# Patient Record
Sex: FEMALE | Race: WHITE | Employment: FULL TIME | URBAN - METROPOLITAN AREA
[De-identification: names, ages, dates, MRNs, and addresses within clinical notes are randomized per-mention and may not be internally consistent; named-entity substitution may affect disease eponyms.]

---

## 2018-01-21 ENCOUNTER — HOSPITAL ENCOUNTER (EMERGENCY)
Age: 38
Discharge: HOME | End: 2018-01-22
Attending: INTERNAL MEDICINE
Payer: COMMERCIAL

## 2018-01-21 VITALS
DIASTOLIC BLOOD PRESSURE: 74 MMHG | SYSTOLIC BLOOD PRESSURE: 142 MMHG | OXYGEN SATURATION: 98 % | HEART RATE: 103 BPM | RESPIRATION RATE: 20 BRPM | BODY MASS INDEX: 25.11 KG/M2 | TEMPERATURE: 98 F | HEIGHT: 67 IN | WEIGHT: 160 LBS

## 2018-01-21 DIAGNOSIS — J40 BRONCHITIS: Primary | ICD-10-CM

## 2018-01-21 LAB
RAPID INFLUENZA  B AGN: NEGATIVE
RAPID INFLUENZA A AGN: NEGATIVE
S PYO AG THROAT QL: NEGATIVE

## 2018-01-21 PROCEDURE — 99284 EMERGENCY DEPT VISIT MOD MDM: CPT

## 2018-01-21 PROCEDURE — 87880 STREP A ASSAY W/OPTIC: CPT

## 2018-01-21 PROCEDURE — 86403 PARTICLE AGGLUT ANTBDY SCRN: CPT

## 2018-01-21 PROCEDURE — 87081 CULTURE SCREEN ONLY: CPT

## 2018-01-21 RX ORDER — IPRATROPIUM BROMIDE AND ALBUTEROL SULFATE 2.5; .5 MG/3ML; MG/3ML
1 SOLUTION RESPIRATORY (INHALATION)
Status: DISCONTINUED | OUTPATIENT
Start: 2018-01-22 | End: 2018-01-22 | Stop reason: HOSPADM

## 2018-01-21 RX ORDER — ALBUTEROL SULFATE 90 UG/1
2 AEROSOL, METERED RESPIRATORY (INHALATION) EVERY 6 HOURS PRN
Qty: 1 INHALER | Refills: 3 | Status: SHIPPED | OUTPATIENT
Start: 2018-01-21 | End: 2020-03-17 | Stop reason: SDUPTHER

## 2018-01-21 ASSESSMENT — PAIN DESCRIPTION - PAIN TYPE: TYPE: ACUTE PAIN

## 2018-01-21 ASSESSMENT — ENCOUNTER SYMPTOMS
SHORTNESS OF BREATH: 1
SORE THROAT: 1
NAUSEA: 1
COUGH: 1
WHEEZING: 1

## 2018-01-21 ASSESSMENT — PAIN DESCRIPTION - LOCATION: LOCATION: CHEST;HEAD

## 2018-01-21 ASSESSMENT — PAIN DESCRIPTION - PROGRESSION: CLINICAL_PROGRESSION: GRADUALLY WORSENING

## 2018-01-21 ASSESSMENT — PAIN DESCRIPTION - ONSET: ONSET: ON-GOING

## 2018-01-21 ASSESSMENT — PAIN DESCRIPTION - DESCRIPTORS: DESCRIPTORS: ACHING

## 2018-01-21 ASSESSMENT — PAIN DESCRIPTION - FREQUENCY: FREQUENCY: CONTINUOUS

## 2018-01-22 ENCOUNTER — APPOINTMENT (OUTPATIENT)
Dept: GENERAL RADIOLOGY | Age: 38
End: 2018-01-22
Payer: COMMERCIAL

## 2018-01-22 LAB — HCG(URINE) PREGNANCY TEST: NEGATIVE

## 2018-01-22 PROCEDURE — 6370000000 HC RX 637 (ALT 250 FOR IP): Performed by: INTERNAL MEDICINE

## 2018-01-22 PROCEDURE — 94640 AIRWAY INHALATION TREATMENT: CPT

## 2018-01-22 PROCEDURE — 84703 CHORIONIC GONADOTROPIN ASSAY: CPT

## 2018-01-22 PROCEDURE — 71045 X-RAY EXAM CHEST 1 VIEW: CPT

## 2018-01-22 RX ORDER — PREDNISONE 20 MG/1
40 TABLET ORAL DAILY
Qty: 6 TABLET | Refills: 0 | Status: SHIPPED | OUTPATIENT
Start: 2018-01-22 | End: 2018-01-25

## 2018-01-22 RX ORDER — AZITHROMYCIN 250 MG/1
TABLET, FILM COATED ORAL
Qty: 4 TABLET | Refills: 0 | Status: SHIPPED | OUTPATIENT
Start: 2018-01-22 | End: 2018-02-01

## 2018-01-22 RX ORDER — PREDNISONE 20 MG/1
40 TABLET ORAL ONCE
Status: COMPLETED | OUTPATIENT
Start: 2018-01-22 | End: 2018-01-22

## 2018-01-22 RX ORDER — AZITHROMYCIN 250 MG/1
500 TABLET, FILM COATED ORAL ONCE
Status: COMPLETED | OUTPATIENT
Start: 2018-01-22 | End: 2018-01-22

## 2018-01-22 RX ORDER — IPRATROPIUM BROMIDE AND ALBUTEROL SULFATE 2.5; .5 MG/3ML; MG/3ML
SOLUTION RESPIRATORY (INHALATION)
Status: DISCONTINUED
Start: 2018-01-22 | End: 2018-01-22 | Stop reason: HOSPADM

## 2018-01-22 RX ADMIN — PREDNISONE 40 MG: 20 TABLET ORAL at 00:50

## 2018-01-22 RX ADMIN — IPRATROPIUM BROMIDE AND ALBUTEROL SULFATE 1 AMPULE: .5; 3 SOLUTION RESPIRATORY (INHALATION) at 00:02

## 2018-01-22 RX ADMIN — AZITHROMYCIN 500 MG: 250 TABLET, FILM COATED ORAL at 00:50

## 2018-01-22 NOTE — ED PROVIDER NOTES
[facility] ED  eMERGENCY dEPARTMENT eNCOUnter      Pt Name: Kenan Hammond  MRN: [id number]  [place] 1980  Date of evaluation: 1/21/2018  Provider: Cristiana Claros MD    CHIEF COMPLAINT       Chief Complaint   Patient presents with    Cough     with sob, history of asthma         HISTORY OF PRESENT ILLNESS   (Location/Symptom, Timing/Onset, Context/Setting, Quality, Duration, Modifying Factors, Severity)  Note limiting factors. Kenan Hammond is a 44 y.o. female who presents to the emergency department For Productive cough with yellowish sputum that developed today. She stated that she started having some wheezing and shortness of breath for which her albuterol is not helpful. She reports that her inhaler is old. She started losing her voice about 3 days ago. Some sore throat. She has not taken her temperature. She's been nauseous as well. By mouth intake is satisfactory. HPI    Nursing Notes were reviewed. REVIEW OF SYSTEMS    (2-9 systems for level 4, 10 or more for level 5)     Review of Systems   Constitutional: Negative. HENT: Positive for sore throat. Respiratory: Positive for cough, shortness of breath and wheezing. Gastrointestinal: Positive for nausea. All other systems reviewed and are negative. Except as noted above the remainder of the review of systems was reviewed and negative.        PAST MEDICAL HISTORY     Past Medical History:   Diagnosis Date    Asthma     Chronic ear infection     Depressed     Double, Major and Dystemic    Fainting     since age 14    Scoliosis     Vertigo     Vision loss     poor perpherial vision, doesn't drive         SURGICAL HISTORY       Past Surgical History:   Procedure Laterality Date    TUBAL LIGATION           CURRENT MEDICATIONS       Previous Medications    ACETAMINOPHEN-CODEINE (TYLENOL #3) 300-30 MG PER TABLET        ELASTIC BANDAGES & SUPPORTS (NEXCARE CARPAL TUNNEL BRACE) MISC    Dx: bilateral carpal normal heart sounds and intact distal pulses. Exam reveals no gallop and no friction rub. No murmur heard. Pulmonary/Chest: Effort normal. No respiratory distress. She has no wheezes. She exhibits no tenderness. Rhonchi through out the lung fields with increased expiratory phase. The cough is harsh and productive   Abdominal: Soft. Bowel sounds are normal. She exhibits no mass. There is no tenderness. There is no rebound and no guarding. Musculoskeletal: Normal range of motion. She exhibits no tenderness. Lymphadenopathy:     She has no cervical adenopathy. Neurological: She is alert and oriented to person, place, and time. She has normal reflexes. Skin: Skin is warm and dry. Psychiatric: She has a normal mood and affect. Her behavior is normal.   Nursing note and vitals reviewed. DIAGNOSTIC RESULTS     EKG: All EKG's are interpreted by the Emergency Department Physician who either signs or Co-signs this chart in the absence of a cardiologist.    Not indicated    RADIOLOGY:   Non-plain film images such as CT, Ultrasound and MRI are read by the radiologist. Plain radiographic images are visualized and preliminarily interpreted by the emergency physician with the below findings:    No acute disease process    Interpretation per the Radiologist below, if available at the time of this note:    XR CHEST PORTABLE    (Results Pending)         ED BEDSIDE ULTRASOUND:   Performed by ED Physician - none    LABS:  Labs Reviewed   RAPID INFLUENZA A/B ANTIGENS   RAPID STREP SCREEN   PREGNANCY, URINE   CULTURE BETA STREP CONFIRM PLATE       All other labs were within normal range or not returned as of this dictation.     EMERGENCY DEPARTMENT COURSE and DIFFERENTIAL DIAGNOSIS/MDM:   Vitals:    Vitals:    01/21/18 2326   BP: (!) 142/74   Pulse: 103   Resp: 20   Temp: 98 °F (36.7 °C)   TempSrc: Oral   SpO2: 98%   Weight: 160 lb (72.6 kg)   Height: 5' 7\" (1.702 m)       Noted    MDM  ED Course as of Jan 22 0050

## 2018-01-24 LAB — S PYO THROAT QL CULT: NORMAL

## 2018-04-09 ENCOUNTER — OFFICE VISIT (OUTPATIENT)
Dept: INTERNAL MEDICINE | Age: 38
End: 2018-04-09
Payer: COMMERCIAL

## 2018-04-09 VITALS
HEART RATE: 108 BPM | WEIGHT: 175 LBS | DIASTOLIC BLOOD PRESSURE: 80 MMHG | OXYGEN SATURATION: 98 % | BODY MASS INDEX: 27.41 KG/M2 | SYSTOLIC BLOOD PRESSURE: 112 MMHG

## 2018-04-09 DIAGNOSIS — R07.89 CHEST PAIN, MUSCULAR: Primary | ICD-10-CM

## 2018-04-09 PROCEDURE — G8419 CALC BMI OUT NRM PARAM NOF/U: HCPCS | Performed by: PHYSICIAN ASSISTANT

## 2018-04-09 PROCEDURE — G8427 DOCREV CUR MEDS BY ELIG CLIN: HCPCS | Performed by: PHYSICIAN ASSISTANT

## 2018-04-09 PROCEDURE — 99213 OFFICE O/P EST LOW 20 MIN: CPT | Performed by: PHYSICIAN ASSISTANT

## 2018-04-09 PROCEDURE — 1036F TOBACCO NON-USER: CPT | Performed by: PHYSICIAN ASSISTANT

## 2018-04-09 ASSESSMENT — PATIENT HEALTH QUESTIONNAIRE - PHQ9
SUM OF ALL RESPONSES TO PHQ QUESTIONS 1-9: 0
SUM OF ALL RESPONSES TO PHQ9 QUESTIONS 1 & 2: 0
1. LITTLE INTEREST OR PLEASURE IN DOING THINGS: 0
2. FEELING DOWN, DEPRESSED OR HOPELESS: 0

## 2019-09-26 ENCOUNTER — OFFICE VISIT (OUTPATIENT)
Dept: FAMILY MEDICINE CLINIC | Age: 39
End: 2019-09-26
Payer: COMMERCIAL

## 2019-09-26 VITALS
WEIGHT: 132.8 LBS | SYSTOLIC BLOOD PRESSURE: 106 MMHG | TEMPERATURE: 98.1 F | DIASTOLIC BLOOD PRESSURE: 70 MMHG | HEART RATE: 81 BPM | OXYGEN SATURATION: 98 % | BODY MASS INDEX: 20.84 KG/M2 | HEIGHT: 67 IN | RESPIRATION RATE: 18 BRPM

## 2019-09-26 DIAGNOSIS — R45.851 SUICIDAL IDEATION: Primary | ICD-10-CM

## 2019-09-26 PROCEDURE — G0444 DEPRESSION SCREEN ANNUAL: HCPCS | Performed by: FAMILY MEDICINE

## 2019-09-26 PROCEDURE — 99214 OFFICE O/P EST MOD 30 MIN: CPT | Performed by: FAMILY MEDICINE

## 2019-09-26 ASSESSMENT — ENCOUNTER SYMPTOMS
BACK PAIN: 0
NAUSEA: 0
VOMITING: 0
SHORTNESS OF BREATH: 0
EYE PAIN: 0
ABDOMINAL PAIN: 0
COUGH: 0
TROUBLE SWALLOWING: 0
CONSTIPATION: 0
CHEST TIGHTNESS: 0
DIARRHEA: 0

## 2019-09-26 ASSESSMENT — COLUMBIA-SUICIDE SEVERITY RATING SCALE - C-SSRS
5. HAVE YOU STARTED TO WORK OUT OR WORKED OUT THE DETAILS OF HOW TO KILL YOURSELF? DO YOU INTEND TO CARRY OUT THIS PLAN?: YES
6. HAVE YOU EVER DONE ANYTHING, STARTED TO DO ANYTHING, OR PREPARED TO DO ANYTHING TO END YOUR LIFE?: YES
1. WITHIN THE PAST MONTH, HAVE YOU WISHED YOU WERE DEAD OR WISHED YOU COULD GO TO SLEEP AND NOT WAKE UP?: YES
2. HAVE YOU ACTUALLY HAD ANY THOUGHTS OF KILLING YOURSELF?: YES
3. HAVE YOU BEEN THINKING ABOUT HOW YOU MIGHT KILL YOURSELF?: YES
7. DID THIS OCCUR IN THE LAST THREE MONTHS: WITHIN THE LAST THREE MONTHS?
4. HAVE YOU HAD THESE THOUGHTS AND HAD SOME INTENTION OF ACTING ON THEM?: YES

## 2019-09-26 ASSESSMENT — PATIENT HEALTH QUESTIONNAIRE - PHQ9
2. FEELING DOWN, DEPRESSED OR HOPELESS: 3
9. THOUGHTS THAT YOU WOULD BE BETTER OFF DEAD, OR OF HURTING YOURSELF: 3
7. TROUBLE CONCENTRATING ON THINGS, SUCH AS READING THE NEWSPAPER OR WATCHING TELEVISION: 3
4. FEELING TIRED OR HAVING LITTLE ENERGY: 3
10. IF YOU CHECKED OFF ANY PROBLEMS, HOW DIFFICULT HAVE THESE PROBLEMS MADE IT FOR YOU TO DO YOUR WORK, TAKE CARE OF THINGS AT HOME, OR GET ALONG WITH OTHER PEOPLE: 1
8. MOVING OR SPEAKING SO SLOWLY THAT OTHER PEOPLE COULD HAVE NOTICED. OR THE OPPOSITE, BEING SO FIGETY OR RESTLESS THAT YOU HAVE BEEN MOVING AROUND A LOT MORE THAN USUAL: 0
6. FEELING BAD ABOUT YOURSELF - OR THAT YOU ARE A FAILURE OR HAVE LET YOURSELF OR YOUR FAMILY DOWN: 3
SUM OF ALL RESPONSES TO PHQ QUESTIONS 1-9: 24
3. TROUBLE FALLING OR STAYING ASLEEP: 3
SUM OF ALL RESPONSES TO PHQ QUESTIONS 1-9: 24
5. POOR APPETITE OR OVEREATING: 3
1. LITTLE INTEREST OR PLEASURE IN DOING THINGS: 3
SUM OF ALL RESPONSES TO PHQ9 QUESTIONS 1 & 2: 6

## 2019-09-26 NOTE — PROGRESS NOTES
Subjective:      Patient ID: Vianney Weber is a 35 y.o. female who presents today for:  Chief Complaint   Patient presents with   [address] from Hospital     follow up from hospital visit on 9-17-19 for overdose on Tylenol and ibrophen       HPI     Patient was hospitalized at [facility] on 9/15/2019 and was discharged on 9/19/2019 for an intentional overdose. She states that she was attempting suicide. She took an entire bottle of acetaminophen and half a bottle of ibuprofen. The patient is positive for both depression screening and suicide screening today at her visit. I called the [facility] and spoke with an intake representative, Herlinda Crook; he explained to me that 911 should be called immediately and the patient should be transferred to the emergency department. 911 was called for both ambulance and police back-up, if needed. The patient is hesitant about going to the hospital, as she is concerned about her job.     Past Medical History:   Diagnosis Date    Allergic rhinitis     Asthma     Chronic ear infection     Depressed     Double, Major and Dystemic    Fainting     since age 14    Headache     Scoliosis     Vertigo     Vision loss     poor perpherial vision, doesn't drive     Past Surgical History:   Procedure Laterality Date    TUBAL LIGATION       Social History     Socioeconomic History    Marital status:      Spouse name: Not on file    Number of children: Not on file    Years of education: Not on file    Highest education level: Not on file   Occupational History    Not on file   Social Needs    Financial resource strain: Not on file    Food insecurity:     Worry: Not on file     Inability: Not on file    Transportation needs:     Medical: Not on file     Non-medical: Not on file   Tobacco Use    Smoking status: Never Smoker    Smokeless tobacco: Never Used   Substance and Sexual Activity    Alcohol use: Yes     Comment: rarely    Wt 132 lb 12.8 oz (60.2 kg)   LMP 09/22/2019 (Exact Date)   SpO2 98%   BMI 20.80 kg/m²     Physical Exam   Constitutional: She is oriented to person, place, and time. She appears well-developed and well-nourished. Patient appears to be a bit disheveled. HENT:   Head: Normocephalic and atraumatic. Eyes: Pupils are equal, round, and reactive to light. EOM are normal.   Neck: Normal range of motion. Neck supple. Cardiovascular: Normal rate, regular rhythm and normal heart sounds. Pulmonary/Chest: Effort normal and breath sounds normal.   Abdominal: Soft. Bowel sounds are normal.   Neurological: She is alert and oriented to person, place, and time. Skin: Skin is warm and dry. Psychiatric:   Depressed affect. Patient verbalizes that she has ideas of suicide all day long. She feels that she is \"in control of these thoughts\". Nursing note and vitals reviewed. No results found for this visit on 09/26/19. Assessment:       Diagnosis Orders   1. Suicidal ideation           Plan:      No orders of the defined types were placed in this encounter. No orders of the defined types were placed in this encounter. 911 was called; both ambulance and police showed up to the office. I personally escorted the patient to the EMS team, which walked her to the ambulance. Police were not needed, as the patient was not combative. Patient was instructed to return for follow-up with me after she is discharged from the hospital.    Return in about 1 week (around 10/3/2019) for Hospital follow up. UpToDate was referenced for current practice guidelines and the current standard of care pertaining specifically to this patient.     Lily Betancourt DO

## 2019-10-03 ENCOUNTER — HOSPITAL ENCOUNTER (EMERGENCY)
Age: 39
Discharge: HOME | End: 2019-10-04
Attending: EMERGENCY MEDICINE
Payer: COMMERCIAL

## 2019-10-03 DIAGNOSIS — L24.5 IRRITANT CONTACT DERMATITIS DUE TO CHEMICAL: Primary | ICD-10-CM

## 2019-10-03 PROCEDURE — 99283 EMERGENCY DEPT VISIT LOW MDM: CPT

## 2019-10-04 VITALS
HEART RATE: 63 BPM | TEMPERATURE: 98.3 F | SYSTOLIC BLOOD PRESSURE: 119 MMHG | BODY MASS INDEX: 21.21 KG/M2 | WEIGHT: 132 LBS | RESPIRATION RATE: 16 BRPM | DIASTOLIC BLOOD PRESSURE: 72 MMHG | OXYGEN SATURATION: 98 % | HEIGHT: 66 IN

## 2019-10-04 RX ORDER — TRAZODONE HYDROCHLORIDE 50 MG/1
50 TABLET ORAL NIGHTLY PRN
COMMUNITY
End: 2021-09-29

## 2019-10-04 RX ORDER — CITALOPRAM 20 MG/1
20 TABLET ORAL DAILY
COMMUNITY
End: 2021-09-29

## 2019-10-04 ASSESSMENT — ENCOUNTER SYMPTOMS
SHORTNESS OF BREATH: 0
SORE THROAT: 0
VOMITING: 0
COUGH: 0
NAUSEA: 0
CONSTIPATION: 0
SINUS PRESSURE: 0
ABDOMINAL PAIN: 0
WHEEZING: 0
EYE PAIN: 0
PHOTOPHOBIA: 0
ABDOMINAL DISTENTION: 0
DIARRHEA: 0
RHINORRHEA: 0
COLOR CHANGE: 0
BACK PAIN: 0
APNEA: 0

## 2019-10-04 ASSESSMENT — PAIN DESCRIPTION - LOCATION: LOCATION: HAND

## 2019-10-04 ASSESSMENT — PAIN DESCRIPTION - ORIENTATION: ORIENTATION: LEFT

## 2019-10-04 ASSESSMENT — PAIN DESCRIPTION - PAIN TYPE: TYPE: ACUTE PAIN

## 2019-10-04 ASSESSMENT — PAIN SCALES - GENERAL: PAINLEVEL_OUTOF10: 2

## 2019-10-04 ASSESSMENT — PAIN DESCRIPTION - DESCRIPTORS: DESCRIPTORS: TINGLING

## 2019-11-21 ENCOUNTER — OFFICE VISIT (OUTPATIENT)
Dept: FAMILY MEDICINE CLINIC | Age: 39
End: 2019-11-21
Payer: COMMERCIAL

## 2019-11-21 VITALS
OXYGEN SATURATION: 98 % | DIASTOLIC BLOOD PRESSURE: 80 MMHG | HEART RATE: 84 BPM | BODY MASS INDEX: 21.6 KG/M2 | RESPIRATION RATE: 20 BRPM | HEIGHT: 66 IN | TEMPERATURE: 98.5 F | SYSTOLIC BLOOD PRESSURE: 132 MMHG | WEIGHT: 134.4 LBS

## 2019-11-21 DIAGNOSIS — R07.9 CHEST PAIN, UNSPECIFIED TYPE: Primary | ICD-10-CM

## 2019-11-21 PROCEDURE — 1036F TOBACCO NON-USER: CPT | Performed by: FAMILY MEDICINE

## 2019-11-21 PROCEDURE — 93000 ELECTROCARDIOGRAM COMPLETE: CPT | Performed by: FAMILY MEDICINE

## 2019-11-21 PROCEDURE — G8427 DOCREV CUR MEDS BY ELIG CLIN: HCPCS | Performed by: FAMILY MEDICINE

## 2019-11-21 PROCEDURE — G8482 FLU IMMUNIZE ORDER/ADMIN: HCPCS | Performed by: FAMILY MEDICINE

## 2019-11-21 PROCEDURE — 99214 OFFICE O/P EST MOD 30 MIN: CPT | Performed by: FAMILY MEDICINE

## 2019-11-21 PROCEDURE — G8420 CALC BMI NORM PARAMETERS: HCPCS | Performed by: FAMILY MEDICINE

## 2019-11-24 ASSESSMENT — ENCOUNTER SYMPTOMS
VOMITING: 0
BACK PAIN: 0
TROUBLE SWALLOWING: 0
COUGH: 0
EYE PAIN: 0
CONSTIPATION: 0
SHORTNESS OF BREATH: 0
ABDOMINAL PAIN: 0
NAUSEA: 0
CHEST TIGHTNESS: 0
DIARRHEA: 0

## 2019-12-11 ENCOUNTER — INITIAL CONSULT (OUTPATIENT)
Dept: CARDIOLOGY CLINIC | Age: 39
End: 2019-12-11
Payer: MEDICAID

## 2019-12-11 ENCOUNTER — TELEPHONE (OUTPATIENT)
Dept: FAMILY MEDICINE CLINIC | Age: 39
End: 2019-12-11

## 2019-12-11 VITALS
HEIGHT: 66 IN | RESPIRATION RATE: 12 BRPM | BODY MASS INDEX: 21.53 KG/M2 | HEART RATE: 82 BPM | SYSTOLIC BLOOD PRESSURE: 126 MMHG | DIASTOLIC BLOOD PRESSURE: 80 MMHG | WEIGHT: 134 LBS

## 2019-12-11 DIAGNOSIS — R07.89 OTHER CHEST PAIN: Primary | ICD-10-CM

## 2019-12-11 PROCEDURE — 99244 OFF/OP CNSLTJ NEW/EST MOD 40: CPT | Performed by: INTERNAL MEDICINE

## 2019-12-11 ASSESSMENT — ENCOUNTER SYMPTOMS
ANAL BLEEDING: 0
COLOR CHANGE: 0
VOMITING: 0
DIARRHEA: 0
SHORTNESS OF BREATH: 0
BLOOD IN STOOL: 0
ABDOMINAL DISTENTION: 0
COUGH: 0
FACIAL SWELLING: 0
TROUBLE SWALLOWING: 0
ABDOMINAL PAIN: 0
VOICE CHANGE: 0
CHEST TIGHTNESS: 0
WHEEZING: 0
APNEA: 0
NAUSEA: 0

## 2019-12-20 ENCOUNTER — HOSPITAL ENCOUNTER (OUTPATIENT)
Dept: NON INVASIVE DIAGNOSTICS | Age: 39
Discharge: HOME | End: 2019-12-20
Payer: MEDICAID

## 2019-12-20 DIAGNOSIS — R07.9 CHEST PAIN, UNSPECIFIED TYPE: ICD-10-CM

## 2019-12-20 LAB
LV EF: 65 %
LVEF MODALITY: NORMAL

## 2019-12-20 PROCEDURE — 93306 TTE W/DOPPLER COMPLETE: CPT

## 2019-12-20 PROCEDURE — 93017 CV STRESS TEST TRACING ONLY: CPT

## 2019-12-30 ENCOUNTER — OFFICE VISIT (OUTPATIENT)
Dept: FAMILY MEDICINE CLINIC | Age: 39
End: 2019-12-30
Payer: MEDICAID

## 2019-12-30 VITALS
SYSTOLIC BLOOD PRESSURE: 122 MMHG | WEIGHT: 142.2 LBS | TEMPERATURE: 98.2 F | OXYGEN SATURATION: 98 % | HEART RATE: 74 BPM | RESPIRATION RATE: 18 BRPM | BODY MASS INDEX: 22.85 KG/M2 | DIASTOLIC BLOOD PRESSURE: 74 MMHG | HEIGHT: 66 IN

## 2019-12-30 DIAGNOSIS — R07.9 CHEST PAIN, UNSPECIFIED TYPE: Primary | ICD-10-CM

## 2019-12-30 PROCEDURE — 99213 OFFICE O/P EST LOW 20 MIN: CPT | Performed by: FAMILY MEDICINE

## 2019-12-30 ASSESSMENT — ENCOUNTER SYMPTOMS
SHORTNESS OF BREATH: 0
DIARRHEA: 0
WHEEZING: 0
COUGH: 0
TROUBLE SWALLOWING: 0
CHEST TIGHTNESS: 1
CONSTIPATION: 0
STRIDOR: 0
NAUSEA: 0
VOMITING: 0
CHOKING: 0
ABDOMINAL PAIN: 0

## 2020-01-15 ENCOUNTER — OFFICE VISIT (OUTPATIENT)
Dept: CARDIOLOGY CLINIC | Age: 40
End: 2020-01-15

## 2020-01-15 VITALS
HEIGHT: 66 IN | HEART RATE: 83 BPM | OXYGEN SATURATION: 98 % | BODY MASS INDEX: 22.5 KG/M2 | RESPIRATION RATE: 20 BRPM | DIASTOLIC BLOOD PRESSURE: 82 MMHG | WEIGHT: 140 LBS | SYSTOLIC BLOOD PRESSURE: 128 MMHG

## 2020-01-15 PROCEDURE — 99213 OFFICE O/P EST LOW 20 MIN: CPT | Performed by: INTERNAL MEDICINE

## 2020-01-15 ASSESSMENT — ENCOUNTER SYMPTOMS
NAUSEA: 0
BLOOD IN STOOL: 0
VOMITING: 0
SHORTNESS OF BREATH: 0
APNEA: 0
DIARRHEA: 0

## 2020-01-15 NOTE — PROGRESS NOTES
[facility] CARDIOLOGY OFFICE FOLLOW-UP      Patient: Andres Fountain  YOB: 1980  MRN: [id number]    Chief Complaint:  Chief Complaint   Patient presents with    1 Month Follow-Up    Chest Pain         Subjective/HPI:  1/15/2020: Patient presents today for follow-up of chest pain. Echo was unremarkable. Stress test was negative for ischemia. Exercise tolerance was diminished. Most likely musculoskeletal pain. He was seen in [facility]. Try to reassure her. She will see me in 6 months     12/11/19: Patient presents today for evaluation of chest pain. She used to work in a factory. Currently unemployed. 35years old white female complains of left sided chest pain more like pressure. No nausea no vomiting. Worse with exertion. No fever or chills. No cough. Does not smoke. She has strong family history of coronary artery disease. She is . Menstrual periods are regular.   Agree with Dr. Vish Roberts for stress test and echo and then see me.         Past Medical History:   Diagnosis Date    Allergic rhinitis     Asthma     Chronic ear infection     Depressed     Double, Major and Dystemic    Fainting     since age 14    Headache     Scoliosis     Vertigo     Vision loss     poor perpherial vision, doesn't drive       Past Surgical History:   Procedure Laterality Date    TUBAL LIGATION         Family History   Problem Relation Age of Onset    Diabetes Mother 15   Sherren Kehr Arthritis Mother     Kidney Disease Mother         stage 3    Heart Disease Mother         CAD    Other Mother         Neurophathy, eye problems    Clotting Disorder Mother         Brain    Asthma Father     Diabetes Sister         due to mom DM    Alcohol Abuse Neg Hx     Allergy (Severe) Neg Hx     Anemia Neg Hx     Arrhythmia Neg Hx     Atrial Fibrillation Neg Hx     Birth Defects Neg Hx     Breast Cancer Neg Hx     Cancer Neg Hx     Coronary Art Dis Neg Hx     Colon Cancer Neg Hx  Depression Neg Hx     Early Death Neg Hx     Heart Attack Neg Hx     Hearing Loss Neg Hx     High Blood Pressure Neg Hx     High Cholesterol Neg Hx     Learning Disabilities Neg Hx     Mental Illness Neg Hx     Mental Retardation Neg Hx     Miscarriages / Stillbirths Neg Hx     Obesity Neg Hx     Osteoporosis Neg Hx     Prostate Cancer Neg Hx     Stroke Neg Hx     Substance Abuse Neg Hx     Vision Loss Neg Hx        Social History     Socioeconomic History    Marital status:      Spouse name: None    Number of children: None    Years of education: None    Highest education level: None   Occupational History    None   Social Needs    Financial resource strain: None    Food insecurity:     Worry: None     Inability: None    Transportation needs:     Medical: None     Non-medical: None   Tobacco Use    Smoking status: Never Smoker    Smokeless tobacco: Never Used   Substance and Sexual Activity    Alcohol use: Yes     Comment: rarely    Drug use: No    Sexual activity: None   Lifestyle    Physical activity:     Days per week: None     Minutes per session: None    Stress: None   Relationships    Social connections:     Talks on phone: None     Gets together: None     Attends Ep[employer]alian service: None     Active member of club or organization: None     Attends meetings of clubs or organizations: None     Relationship status: None    Intimate partner violence:     Fear of current or ex partner: None     Emotionally abused: None     Physically abused: None     Forced sexual activity: None   Other Topics Concern    None   Social History Narrative    None       Allergies   Allergen Reactions    Latex Hives    Bismuth Subsalicylate Hives    Antibacterial Hand Soap [Triclosan] Hives    Kiwi Extract Hives    Strawberry Extract Hives    Viibryd [Vilazodone Hcl] Hives    Effexor [Venlafaxine] Rash       Current Outpatient Medications   Medication Sig Dispense Refill    citalopram (CELEXA) 20 MG tablet Take 20 mg by mouth daily      traZODone (DESYREL) 50 MG tablet Take 50 mg by mouth nightly as needed for Sleep      Multiple Vitamins-Minerals (MULTIVITAMIN ADULTS PO) Take by mouth daily      vitamin D (CHOLECALCIFEROL) 1000 UNIT TABS tablet Take 1,000 Units by mouth daily      albuterol sulfate HFA (VENTOLIN HFA) 108 (90 Base) MCG/ACT inhaler Inhale 2 puffs into the lungs every 6 hours as needed for Wheezing 1 Inhaler 3    TRINTELLIX 20 MG TABS tablet       acetaminophen-codeine (TYLENOL #3) 300-30 MG per tablet   0    Elastic Bandages & Supports (NEXCARE CARPAL TUNNEL BRACE) MISC Dx: bilateral carpal tunnel 2 each 0     No current facility-administered medications for this visit. Review of Systems:   Review of Systems   Constitutional: Negative for activity change, appetite change, diaphoresis, fatigue and unexpected weight change. HENT: Negative for facial swelling, nosebleeds, trouble swallowing and voice change. Respiratory: Positive for chest tightness. Negative for apnea, shortness of breath and wheezing. Cardiovascular: Negative for chest pain, palpitations and leg swelling. Gastrointestinal: Negative for abdominal distention, anal bleeding, blood in stool, diarrhea, nausea and vomiting. Genitourinary: Negative for decreased urine volume and dysuria. Musculoskeletal: Negative for gait problem, myalgias, neck pain and neck stiffness. Skin: Negative for color change, pallor, rash and wound. Neurological: Negative for dizziness, seizures, syncope, facial asymmetry, weakness, light-headedness, numbness and headaches. Hematological: Does not bruise/bleed easily. Psychiatric/Behavioral: Negative for agitation, behavioral problems, confusion, hallucinations and suicidal ideas. The patient is not nervous/anxious. All other systems reviewed and are negative. Review of System is negative except for as mentioned above.       Physical Examination:    /82 (Site: Left Upper Arm, Position: Sitting, Cuff Size: Medium Adult)   Pulse 83   Resp 20   Ht 5' 6\" (1.676 m)   Wt 140 lb (63.5 kg)   LMP 01/06/2020 (Approximate)   SpO2 98%   BMI 22.60 kg/m²    Physical Exam   Constitutional: She appears healthy. No distress. HENT:   Nose: Nose normal.   Mouth/Throat: Dentition is normal. Oropharynx is clear. Eyes: Pupils are equal, round, and reactive to light. Conjunctivae are normal.   Neck: Normal range of motion and thyroid normal. Neck supple. Cardiovascular: Regular rhythm, S1 normal, S2 normal, normal heart sounds, intact distal pulses and normal pulses. PMI is not displaced. No murmur heard. Pulmonary/Chest: She has no wheezes. She has no rales. She exhibits no tenderness. Abdominal: Soft. Bowel sounds are normal. She exhibits no distension and no mass. There is no splenomegaly or hepatomegaly. There is no tenderness. No hernia. Neurological: She is alert and oriented to person, place, and time. She has normal motor skills. Gait normal.   Skin: Skin is warm and dry. No cyanosis. No jaundice. Nails show no clubbing. Patient Active Problem List   Diagnosis    Asthma    Allergic rhinitis    Chronic ear infection    Depressed    Fainting    Headache    Scoliosis    Vertigo    Vision loss           No orders of the defined types were placed in this encounter. No orders of the defined types were placed in this encounter. Assessment:    1. Other chest pain       Plan:     Stay on same medications. See me in 6 months. This note was partially generated using Dragon voice recognition system, and there may be some incorrect words, spellings, punctuation that were not noticed in checking the note before saving.         Electronically signed by Isak Becker MD on 1/21/2020 at 9:24 AM

## 2020-01-19 ASSESSMENT — ENCOUNTER SYMPTOMS
ABDOMINAL DISTENTION: 0
COLOR CHANGE: 0
TROUBLE SWALLOWING: 0
ANAL BLEEDING: 0
VOICE CHANGE: 0
FACIAL SWELLING: 0
CHEST TIGHTNESS: 1
WHEEZING: 0

## 2020-03-17 ENCOUNTER — OFFICE VISIT (OUTPATIENT)
Dept: PRIMARY CARE CLINIC | Age: 40
End: 2020-03-17
Payer: COMMERCIAL

## 2020-03-17 VITALS
HEIGHT: 66 IN | OXYGEN SATURATION: 99 % | BODY MASS INDEX: 24.3 KG/M2 | WEIGHT: 151.2 LBS | HEART RATE: 84 BPM | SYSTOLIC BLOOD PRESSURE: 126 MMHG | RESPIRATION RATE: 16 BRPM | DIASTOLIC BLOOD PRESSURE: 78 MMHG | TEMPERATURE: 98.3 F

## 2020-03-17 PROCEDURE — 99213 OFFICE O/P EST LOW 20 MIN: CPT | Performed by: FAMILY MEDICINE

## 2020-03-17 RX ORDER — PREDNISONE 10 MG/1
TABLET ORAL
Qty: 42 TABLET | Refills: 0 | Status: SHIPPED | OUTPATIENT
Start: 2020-03-17 | End: 2021-09-29

## 2020-03-17 RX ORDER — ALBUTEROL SULFATE 90 UG/1
2 AEROSOL, METERED RESPIRATORY (INHALATION) EVERY 6 HOURS PRN
Qty: 1 INHALER | Refills: 3 | Status: SHIPPED | OUTPATIENT
Start: 2020-03-17 | End: 2021-12-08 | Stop reason: SDUPTHER

## 2020-03-17 RX ORDER — ALBUTEROL SULFATE 2.5 MG/3ML
2.5 SOLUTION RESPIRATORY (INHALATION) EVERY 6 HOURS PRN
Qty: 120 EACH | Refills: 3 | Status: SHIPPED | OUTPATIENT
Start: 2020-03-17

## 2020-03-17 RX ORDER — AZITHROMYCIN 250 MG/1
250 TABLET, FILM COATED ORAL SEE ADMIN INSTRUCTIONS
Qty: 6 TABLET | Refills: 0 | Status: SHIPPED | OUTPATIENT
Start: 2020-03-17 | End: 2020-03-22

## 2020-03-17 RX ORDER — PRAZOSIN HYDROCHLORIDE 1 MG/1
1 CAPSULE ORAL NIGHTLY
COMMUNITY
End: 2021-09-29

## 2020-03-17 ASSESSMENT — PATIENT HEALTH QUESTIONNAIRE - PHQ9
2. FEELING DOWN, DEPRESSED OR HOPELESS: 0
SUM OF ALL RESPONSES TO PHQ QUESTIONS 1-9: 0
SUM OF ALL RESPONSES TO PHQ QUESTIONS 1-9: 0
1. LITTLE INTEREST OR PLEASURE IN DOING THINGS: 0
SUM OF ALL RESPONSES TO PHQ9 QUESTIONS 1 & 2: 0

## 2020-03-17 NOTE — PROGRESS NOTES
activity     Days per week: Not on file     Minutes per session: Not on file    Stress: Not on file   Relationships    Social connections     Talks on phone: Not on file     Gets together: Not on file     Attends Mu-ism service: Not on file     Active member of club or organization: Not on file     Attends meetings of clubs or organizations: Not on file     Relationship status: Not on file    Intimate partner violence     Fear of current or ex partner: Not on file     Emotionally abused: Not on file     Physically abused: Not on file     Forced sexual activity: Not on file   Other Topics Concern    Not on file   Social History Narrative    Not on file     Family History   Problem Relation Age of Onset    Diabetes Mother 15   Victory Clapper Arthritis Mother     Kidney Disease Mother         stage 4    Heart Disease Mother         CAD    Other Mother         Neurophathy, eye problems    Clotting Disorder Mother         Brain    Asthma Father     Diabetes Sister         due to mom DM    Alcohol Abuse Neg Hx     Allergy (Severe) Neg Hx     Anemia Neg Hx     Arrhythmia Neg Hx     Atrial Fibrillation Neg Hx     Birth Defects Neg Hx     Breast Cancer Neg Hx     Cancer Neg Hx     Coronary Art Dis Neg Hx     Colon Cancer Neg Hx     Depression Neg Hx     Early Death Neg Hx     Heart Attack Neg Hx     Hearing Loss Neg Hx     High Blood Pressure Neg Hx     High Cholesterol Neg Hx     Learning Disabilities Neg Hx     Mental Illness Neg Hx     Mental Retardation Neg Hx     Miscarriages / Stillbirths Neg Hx     Obesity Neg Hx     Osteoporosis Neg Hx     Prostate Cancer Neg Hx     Stroke Neg Hx     Substance Abuse Neg Hx     Vision Loss Neg Hx      Allergies   Allergen Reactions    Latex Hives    Bismuth Subsalicylate Hives    Antibacterial Hand Soap [Triclosan] Hives    Kiwi Extract Hives    Strawberry Extract Hives    Viibryd [Vilazodone Hcl] Hives    Effexor [Venlafaxine] Rash     Current Outpatient Medications on File Prior to Visit   Medication Sig Dispense Refill    prazosin (MINIPRESS) 1 MG capsule Take 1 mg by mouth nightly      traZODone (DESYREL) 50 MG tablet Take 50 mg by mouth nightly as needed for Sleep      Multiple Vitamins-Minerals (MULTIVITAMIN ADULTS PO) Take by mouth daily      vitamin D (CHOLECALCIFEROL) 1000 UNIT TABS tablet Take 1,000 Units by mouth daily      TRINTELLIX 20 MG TABS tablet       acetaminophen-codeine (TYLENOL #3) 300-30 MG per tablet   0    Elastic Bandages & Supports (NEXCARE CARPAL TUNNEL BRACE) MISC Dx: bilateral carpal tunnel 2 each 0    citalopram (CELEXA) 20 MG tablet Take 20 mg by mouth daily       No current facility-administered medications on file prior to visit. Review of Systems   Constitutional: Negative for chills, fever and unexpected weight change. HENT: Negative for congestion, rhinorrhea, sinus pressure, sinus pain, sore throat, tinnitus and trouble swallowing. Eyes: Negative for visual disturbance. Respiratory: Positive for cough, chest tightness, shortness of breath and wheezing. Cardiovascular: Negative for chest pain. Gastrointestinal: Negative for abdominal pain, constipation, diarrhea, nausea and vomiting. Skin: Negative for rash. Neurological: Negative for weakness and headaches. Psychiatric/Behavioral: Negative for suicidal ideas. Objective:   /78 (Site: Left Upper Arm, Position: Sitting, Cuff Size: Small Adult)   Pulse 84   Temp 98.3 °F (36.8 °C) (Oral)   Resp 16   Ht 5' 6\" (1.676 m)   Wt 151 lb 3.2 oz (68.6 kg)   LMP 02/28/2020 (Approximate)   SpO2 99%   Breastfeeding No   BMI 24.40 kg/m²     Physical Exam  Vitals signs and nursing note reviewed. Constitutional:       Appearance: Normal appearance. She is well-developed. HENT:      Head: Normocephalic and atraumatic.       Right Ear: Tympanic membrane, ear canal and external ear normal.      Left Ear: Tympanic membrane, ear canal albuterol sulfate HFA (VENTOLIN HFA) 108 (90 Base) MCG/ACT inhaler     Sig: Inhale 2 puffs into the lungs every 6 hours as needed for Wheezing     Dispense:  1 Inhaler     Refill:  3    albuterol (PROVENTIL) (2.5 MG/3ML) 0.083% nebulizer solution     Sig: Take 3 mLs by nebulization every 6 hours as needed for Wheezing     Dispense:  120 each     Refill:  3    predniSONE (DELTASONE) 10 MG tablet     Sig: Take 40mg po bid X 2 days,then 30mg po bid X 2 days,then 20mg po bid X 2 days,then 20mg po QD X 2 days,then 10mg po QD x 2 days,then stop. Dispense:  42 tablet     Refill:  0    azithromycin (ZITHROMAX) 250 MG tablet     Sig: Take 1 tablet by mouth See Admin Instructions for 5 days 500mg on day 1 followed by 250mg on days 2 - 5     Dispense:  6 tablet     Refill:  0     After this acute exacerbation resolves, patient is open to trying a long-acting inhaled corticosteroid for maintenance for her asthma. Return if symptoms worsen or fail to improve. UpToDate was referenced for current practice guidelines and the current standard of care pertaining specifically to this patient. Please note this report has been partially produced using speech recognition software and may cause contain errors related to that system including grammar, punctuation and spelling as well as words and phrases that may seem inappropriate. If there are questions or concerns please feel free to contact me to clarify.     Lily Betancourt, DO

## 2020-03-18 ASSESSMENT — ENCOUNTER SYMPTOMS
SHORTNESS OF BREATH: 1
ABDOMINAL PAIN: 0
CONSTIPATION: 0
NAUSEA: 0
CHEST TIGHTNESS: 1
SORE THROAT: 0
WHEEZING: 1
VOMITING: 0
SINUS PRESSURE: 0
RHINORRHEA: 0
SINUS PAIN: 0
TROUBLE SWALLOWING: 0
DIARRHEA: 0
COUGH: 1

## 2020-06-09 ENCOUNTER — TELEPHONE (OUTPATIENT)
Dept: PRIMARY CARE CLINIC | Age: 40
End: 2020-06-09

## 2020-06-15 ENCOUNTER — HOSPITAL ENCOUNTER (OUTPATIENT)
Dept: CT IMAGING | Age: 40
Discharge: HOME | End: 2020-06-17
Payer: COMMERCIAL

## 2020-06-15 PROCEDURE — 71250 CT THORAX DX C-: CPT

## 2021-09-21 ENCOUNTER — TELEPHONE (OUTPATIENT)
Dept: PRIMARY CARE CLINIC | Age: 41
End: 2021-09-21

## 2021-09-21 NOTE — TELEPHONE ENCOUNTER
----- Message from Zehra Dueñas sent at 2021 12:11 PM EDT -----  Subject: Appointment Request    Reason for Call: New Patient Request Appointment    QUESTIONS  Type of Appointment? Established Patient  Reason for appointment request? No appointments available during search  Additional Information for Provider? Patient would like to schedule an   appt and establish care with a provider at this location because it is   more convenient for her and her PCP is no longer with [facility]. ---------------------------------------------------------------------------  --------------  Edvin Muse INFO  What is the best way for the office to contact you? OK to leave message on   voicemail  Preferred Call Back Phone Number? [phone]  ---------------------------------------------------------------------------  --------------  SCRIPT ANSWERS  Relationship to Patient? Self  Specialty Confirmation? Primary Care  Is this the first appointment to establish care for a ? No  Have you been diagnosed with, awaiting test results for, or told that you   are suspected of having COVID-19 (Coronavirus)? (If patient has tested   negative or was tested as a requirement for work, school, or travel and   not based on symptoms, answer no)? No  Within the past two weeks have you developed any of the following symptoms   (answer no if symptoms have been present longer than 2 weeks or began   more than 2 weeks ago)? Fever or Chills, Cough, Shortness of breath or   difficulty breathing, Loss of taste or smell, Sore throat, Nasal   congestion, Sneezing or runny nose, Fatigue or generalized body aches   (answer no if pain is specific to a body part e.g. back pain), Diarrhea,   Headache? No  Have you had close contact with someone with COVID-19 in the last 14 days? No  (Service Expert  click yes below to proceed with [facility] As Usual   Scheduling)?  Yes

## 2021-09-29 ENCOUNTER — OFFICE VISIT (OUTPATIENT)
Dept: PRIMARY CARE CLINIC | Age: 41
End: 2021-09-29
Payer: COMMERCIAL

## 2021-09-29 VITALS
SYSTOLIC BLOOD PRESSURE: 122 MMHG | HEIGHT: 66 IN | DIASTOLIC BLOOD PRESSURE: 70 MMHG | OXYGEN SATURATION: 96 % | RESPIRATION RATE: 16 BRPM | BODY MASS INDEX: 25.54 KG/M2 | HEART RATE: 73 BPM | WEIGHT: 158.9 LBS | TEMPERATURE: 98.6 F

## 2021-09-29 DIAGNOSIS — Z00.00 PREVENTATIVE HEALTH CARE: ICD-10-CM

## 2021-09-29 DIAGNOSIS — G56.93 NEUROPATHY OF BOTH UPPER EXTREMITIES: ICD-10-CM

## 2021-09-29 DIAGNOSIS — Z12.39 SCREENING BREAST EXAMINATION: ICD-10-CM

## 2021-09-29 DIAGNOSIS — L82.0 SEBORRHEIC KERATOSES, INFLAMED: Primary | ICD-10-CM

## 2021-09-29 DIAGNOSIS — Z80.0 FAMILY HX OF COLON CANCER: ICD-10-CM

## 2021-09-29 DIAGNOSIS — J45.41 MODERATE PERSISTENT ASTHMA WITH ACUTE EXACERBATION: ICD-10-CM

## 2021-09-29 DIAGNOSIS — M79.7 FIBROMYALGIA: ICD-10-CM

## 2021-09-29 PROCEDURE — 1036F TOBACCO NON-USER: CPT | Performed by: INTERNAL MEDICINE

## 2021-09-29 PROCEDURE — G8427 DOCREV CUR MEDS BY ELIG CLIN: HCPCS | Performed by: INTERNAL MEDICINE

## 2021-09-29 PROCEDURE — G8419 CALC BMI OUT NRM PARAM NOF/U: HCPCS | Performed by: INTERNAL MEDICINE

## 2021-09-29 PROCEDURE — 99214 OFFICE O/P EST MOD 30 MIN: CPT | Performed by: INTERNAL MEDICINE

## 2021-09-29 PROCEDURE — 90471 IMMUNIZATION ADMIN: CPT | Performed by: INTERNAL MEDICINE

## 2021-09-29 PROCEDURE — 90674 CCIIV4 VAC NO PRSV 0.5 ML IM: CPT | Performed by: INTERNAL MEDICINE

## 2021-09-29 SDOH — ECONOMIC STABILITY: FOOD INSECURITY: WITHIN THE PAST 12 MONTHS, THE FOOD YOU BOUGHT JUST DIDN'T LAST AND YOU DIDN'T HAVE MONEY TO GET MORE.: OFTEN TRUE

## 2021-09-29 SDOH — ECONOMIC STABILITY: FOOD INSECURITY: WITHIN THE PAST 12 MONTHS, YOU WORRIED THAT YOUR FOOD WOULD RUN OUT BEFORE YOU GOT MONEY TO BUY MORE.: OFTEN TRUE

## 2021-09-29 ASSESSMENT — ENCOUNTER SYMPTOMS
NAUSEA: 0
ABDOMINAL PAIN: 0
WHEEZING: 0
VOMITING: 0
DIARRHEA: 0
SHORTNESS OF BREATH: 0
COUGH: 0
COLOR CHANGE: 1

## 2021-09-29 ASSESSMENT — SOCIAL DETERMINANTS OF HEALTH (SDOH): HOW HARD IS IT FOR YOU TO PAY FOR THE VERY BASICS LIKE FOOD, HOUSING, MEDICAL CARE, AND HEATING?: HARD

## 2021-09-29 NOTE — PROGRESS NOTES
Subjective:      Patient ID: Leslee Dalton is a 42 y.o. female    Mole x 7 months   Arm numbness x 3 weeks  HPI  Pt is new to provider. Previously with Dr. Marleni Clark asthma, depression, vertigo    Meds: albuterol   Gmother had colon cancer   Last pap: at least 5 yrs ago   Last mammogram: never     Left upper chest mole x 7 months  No meadows in size but surrounding redness is new with assoc itch. Mother had skin cancer. Numbness in both arms x 3 weeks    Assoc forearm tingling, no swelling of arms. Assoc mid neck pain, no weakness, no loss of . PE showed marked trigger oint TTP.       [place] persistent asthma with acute exacerbation-controlled   Past Medical History:   Diagnosis Date    Allergic rhinitis     Asthma     Chronic ear infection     Depressed     Double, Major and Dystemic    Fainting     since age 14    Headache     Scoliosis     Vertigo     Vision loss     poor perpherial vision, doesn't drive     Past Surgical History:   Procedure Laterality Date    TUBAL LIGATION       Social History     Socioeconomic History    Marital status:      Spouse name: Not on file    Number of children: Not on file    Years of education: Not on file    Highest education level: Not on file   Occupational History    Not on file   Tobacco Use    Smoking status: Never Smoker    Smokeless tobacco: Never Used   Vaping Use    Vaping Use: Never used   Substance and Sexual Activity    Alcohol use: Yes     Comment: rarely    Drug use: No    Sexual activity: Not on file   Other Topics Concern    Not on file   Social History Narrative    Not on file     Social Determinants of Health     Financial Resource Strain: High Risk    Difficulty of Paying Living Expenses: Hard   Food Insecurity: Food Insecurity Present    Worried About Running Out of Food in the Last Year: Often true    Randall of Food in the Last Year: Often true   Transportation Needs:     Lack of Transportation (Medical):  Lack of Transportation (Non-Medical):    Physical Activity:     Days of Exercise per Week:     Minutes of Exercise per Session:    Stress:     Feeling of Stress :    Social Connections:     Frequency of Communication with Friends and Family:     Frequency of Social Gatherings with Friends and Family:     Attends Scientology Services:     Active Member of Clubs or Organizations:     Attends Club or Organization Meetings:     Marital Status:    Intimate Partner Violence:     Fear of Current or Ex-Partner:     Emotionally Abused:     Physically Abused:     Sexually Abused:      Family History   Problem Relation Age of Onset    Diabetes Mother 15    Arthritis Mother     Kidney Disease Mother         stage 3    Heart Disease Mother         CAD    Other Mother         Neurophathy, eye problems    Clotting Disorder Mother         Brain    Asthma Father     Diabetes Sister         due to mom DM    Alcohol Abuse Neg Hx     Allergy (Severe) Neg Hx     Anemia Neg Hx     Arrhythmia Neg Hx     Atrial Fibrillation Neg Hx     Birth Defects Neg Hx     Breast Cancer Neg Hx     Cancer Neg Hx     Coronary Art Dis Neg Hx     Colon Cancer Neg Hx     Depression Neg Hx     Early Death Neg Hx     Heart Attack Neg Hx     Hearing Loss Neg Hx     High Blood Pressure Neg Hx     High Cholesterol Neg Hx     Learning Disabilities Neg Hx     Mental Illness Neg Hx     Mental Retardation Neg Hx     Miscarriages / Stillbirths Neg Hx     Obesity Neg Hx     Osteoporosis Neg Hx     Prostate Cancer Neg Hx     Stroke Neg Hx     Substance Abuse Neg Hx     Vision Loss Neg Hx      Allergies:  Latex, Bismuth subsalicylate, Antibacterial hand soap [triclosan], Kiwi extract, Strawberry extract, Viibryd [vilazodone hcl], and Effexor [venlafaxine]  Patient Active Problem List   Diagnosis    Asthma    Allergic rhinitis    Chronic ear infection    Depressed    Fainting    Headache    Scoliosis    Vertigo    Cardiovascular:      Rate and Rhythm: Normal rate and regular rhythm. Pulses: Normal pulses. Heart sounds: Normal heart sounds, S1 normal and S2 normal.   Pulmonary:      Effort: Pulmonary effort is normal. No respiratory distress. Breath sounds: Normal breath sounds. No wheezing or rales. Chest:      Chest wall: No tenderness. Abdominal:      General: Bowel sounds are normal.      Tenderness: There is no abdominal tenderness. Musculoskeletal:      Comments: Marked TTP at trigger points     Left upper chest hyperpigmented soft skin tag with surrounding erythema and TTP   Neurological:      General: No focal deficit present. Mental Status: She is alert. Cranial Nerves: No cranial nerve deficit. Motor: No weakness. Psychiatric:         Mood and Affect: Mood normal.         Thought Content: Thought content normal.       Assessment:       Diagnosis Orders   1. Seborrheic keratoses, inflamed  AFL - Rufus Barrios MD, Dermatology, [place]   2. Neuropathy of both upper extremities  XR CERVICAL SPINE (4-5 VIEWS)   3. Moderate persistent asthma with acute exacerbation Controlled    4. Family hx of colon cancer  [id number], MD Genet, Gastroenterology, [place]   5. Preventative health care  Hepatitis C Antibody    HIV Screen    Lipid Panel    INFLUENZA, MDCK QUADV, 2 YRS AND OLDER, IM, PF, PREFILL SYR OR SDV, 0.5ML (FLUCELVAX QUADV, PF)   6. Screening breast examination  YADY DIGITAL SCREEN W OR WO CAD BILATERAL   7.  Fibromyalgia  Amb External Referral To Rheumatology     Plan:      Orders Placed This Encounter   Procedures    YADY DIGITAL SCREEN W OR WO CAD BILATERAL     Standing Status:   Future     Standing Expiration Date:   11/29/2022    XR CERVICAL SPINE (4-5 VIEWS)     Standing Status:   Future     Standing Expiration Date:   9/29/2022    INFLUENZA, MDCK QUADV, 2 YRS AND OLDER, IM, PF, PREFILL SYR OR SDV, 0.5ML (FLUCELVAX QUADV, PF)    Hepatitis C Antibody     Standing Status:   Future     Standing Expiration Date:   9/29/2022    HIV Screen     Standing Status:   Future     Standing Expiration Date:   9/29/2022    Lipid Panel     Standing Status:   Future     Standing Expiration Date:   9/29/2022     Order Specific Question:   Is Patient Fasting?/# of Hours     Answer:   yes   Lady Beltrán, Gastroenterology, [place]     Referral Priority:   Routine     Referral Type:   Eval and Treat     Referral Reason:   Specialty Services Required     Referred to Provider:   Jaden Carrillo MD     Requested Specialty:   Gastroenterology     Number of Visits Requested:   Jonnathan Banuelos MD, Dermatology, [facility]     Referral Priority:   Routine     Referral Type:   Eval and Treat     Referral Reason:   Specialty Services Required     Referred to Provider:   Robinson Stevens DO     Requested Specialty:   Dermatology     Number of Visits Requested:   1    Amb External Referral To Rheumatology     Referral Priority:   Routine     Referral Type:   Eval and Treat     Referral Reason:   Specialty Services Required     Referred to Provider:   Xavier Guillermo MD     Requested Specialty:   Rheumatology     Number of Visits Requested:   1     No orders of the defined types were placed in this encounter.     Return in about 2 weeks (around 10/13/2021) for pap test.

## 2021-10-03 DIAGNOSIS — M54.12 CERVICAL RADICULOPATHY: Primary | ICD-10-CM

## 2021-10-03 RX ORDER — GABAPENTIN 100 MG/1
100 CAPSULE ORAL 2 TIMES DAILY
Qty: 120 CAPSULE | Refills: 1 | Status: SHIPPED | OUTPATIENT
Start: 2021-10-03 | End: 2021-12-11 | Stop reason: SDUPTHER

## 2021-10-13 ENCOUNTER — OFFICE VISIT (OUTPATIENT)
Dept: PRIMARY CARE CLINIC | Age: 41
End: 2021-10-13
Payer: COMMERCIAL

## 2021-10-13 VITALS
SYSTOLIC BLOOD PRESSURE: 110 MMHG | TEMPERATURE: 98.6 F | BODY MASS INDEX: 24.8 KG/M2 | OXYGEN SATURATION: 99 % | RESPIRATION RATE: 16 BRPM | WEIGHT: 158 LBS | HEIGHT: 67 IN | HEART RATE: 75 BPM | DIASTOLIC BLOOD PRESSURE: 78 MMHG

## 2021-10-13 DIAGNOSIS — Z01.419 ENCOUNTER FOR GYNECOLOGICAL EXAMINATION: Primary | ICD-10-CM

## 2021-10-13 PROCEDURE — 99396 PREV VISIT EST AGE 40-64: CPT | Performed by: INTERNAL MEDICINE

## 2021-10-13 RX ORDER — POLYETHYLENE GLYCOL 3350, SODIUM CHLORIDE, SODIUM BICARBONATE, POTASSIUM CHLORIDE 420; 11.2; 5.72; 1.48 G/4L; G/4L; G/4L; G/4L
4000 POWDER, FOR SOLUTION ORAL ONCE
Qty: 4000 ML | Refills: 0 | Status: SHIPPED | OUTPATIENT
Start: 2021-10-13 | End: 2021-10-13

## 2021-10-13 ASSESSMENT — ENCOUNTER SYMPTOMS
NAUSEA: 0
ABDOMINAL PAIN: 0
DIARRHEA: 0
SHORTNESS OF BREATH: 0
COUGH: 0
WHEEZING: 0
VOMITING: 0

## 2021-10-13 NOTE — PROGRESS NOTES
Subjective:      Patient ID: Wilian Nichols is a 42 y.o. female    Pap smear   HPI  Patient presents for a pap test today. Last pap test was at least 5 yrs ago and showed negative intraepithelial neoplasia. Denies hx cervical or breast cancer in self or family. No vaginal bleed, discharge or itch. Past Medical History:   Diagnosis Date    Allergic rhinitis     Asthma     Chronic ear infection     Depressed     Double, Major and Dystemic    Fainting     since age 14    Headache     Scoliosis     Vertigo     Vision loss     poor perpherial vision, doesn't drive     Past Surgical History:   Procedure Laterality Date    TUBAL LIGATION       Social History     Socioeconomic History    Marital status:      Spouse name: Not on file    Number of children: Not on file    Years of education: Not on file    Highest education level: Not on file   Occupational History    Not on file   Tobacco Use    Smoking status: Never Smoker    Smokeless tobacco: Never Used   Vaping Use    Vaping Use: Never used   Substance and Sexual Activity    Alcohol use: Yes     Comment: rarely    Drug use: No    Sexual activity: Not on file   Other Topics Concern    Not on file   Social History Narrative    Not on file     Social Determinants of Health     Financial Resource Strain: High Risk    Difficulty of Paying Living Expenses: Hard   Food Insecurity: Food Insecurity Present    Worried About Running Out of Food in the Last Year: Often true    Randall of Food in the Last Year: Often true   Transportation Needs:     Lack of Transportation (Medical):      Lack of Transportation (Non-Medical):    Physical Activity:     Days of Exercise per Week:     Minutes of Exercise per Session:    Stress:     Feeling of Stress :    Social Connections:     Frequency of Communication with Friends and Family:     Frequency of Social Gatherings with Friends and Family:     Attends Adventist Services:     Active Member of Clubs or Organizations:     Attends Club or Organization Meetings:     Marital Status:    Intimate Partner Violence:     Fear of Current or Ex-Partner:     Emotionally Abused:     Physically Abused:     Sexually Abused:      Family History   Problem Relation Age of Onset    Diabetes Mother 15    Arthritis Mother     Kidney Disease Mother         stage 3    Heart Disease Mother         CAD    Other Mother         Neurophathy, eye problems    Clotting Disorder Mother         Brain    Asthma Father     Diabetes Sister         due to mom DM    Alcohol Abuse Neg Hx     Allergy (Severe) Neg Hx     Anemia Neg Hx     Arrhythmia Neg Hx     Atrial Fibrillation Neg Hx     Birth Defects Neg Hx     Breast Cancer Neg Hx     Cancer Neg Hx     Coronary Art Dis Neg Hx     Colon Cancer Neg Hx     Depression Neg Hx     Early Death Neg Hx     Heart Attack Neg Hx     Hearing Loss Neg Hx     High Blood Pressure Neg Hx     High Cholesterol Neg Hx     Learning Disabilities Neg Hx     Mental Illness Neg Hx     Mental Retardation Neg Hx     Miscarriages / Stillbirths Neg Hx     Obesity Neg Hx     Osteoporosis Neg Hx     Prostate Cancer Neg Hx     Stroke Neg Hx     Substance Abuse Neg Hx     Vision Loss Neg Hx      Allergies:  Latex, Bismuth subsalicylate, Antibacterial hand soap [triclosan], Kiwi extract, Strawberry extract, Viibryd [vilazodone hcl], and Effexor [venlafaxine]  Patient Active Problem List   Diagnosis    Asthma    Allergic rhinitis    Chronic ear infection    Depressed    Fainting    Headache    Scoliosis    Vertigo    Vision loss     Current Outpatient Medications on File Prior to Visit   Medication Sig Dispense Refill    gabapentin (NEURONTIN) 100 MG capsule Take 1 capsule by mouth 2 times daily for 60 days.  Intended supply: 90 days 120 capsule 1    albuterol sulfate HFA (VENTOLIN HFA) 108 (90 Base) MCG/ACT inhaler Inhale 2 puffs into the lungs every 6 hours as needed for Wheezing 1 Inhaler 3    albuterol (PROVENTIL) (2.5 MG/3ML) 0.083% nebulizer solution Take 3 mLs by nebulization every 6 hours as needed for Wheezing 120 each 3    Multiple Vitamins-Minerals (MULTIVITAMIN ADULTS PO) Take by mouth daily      vitamin D (CHOLECALCIFEROL) 1000 UNIT TABS tablet Take 1,000 Units by mouth daily      acetaminophen-codeine (TYLENOL #3) 300-30 MG per tablet   0    Elastic Bandages & Supports (NEXCARE CARPAL TUNNEL BRACE) MISC Dx: bilateral carpal tunnel 2 each 0    polyethylene glycol-electrolytes (TRILYTE) 420 g solution Take 4,000 mLs by mouth once for 1 dose CALL [phone] FOR ISSUES WITH INSURANCE (Patient not taking: Reported on 10/13/2021) 4000 mL 0     No current facility-administered medications on file prior to visit. Review of Systems   Constitutional: Negative for chills. Respiratory: Negative for cough, shortness of breath and wheezing. Cardiovascular: Negative for chest pain. Gastrointestinal: Negative for abdominal pain, diarrhea, nausea and vomiting. Endocrine: Negative for cold intolerance and heat intolerance. Genitourinary: Negative for dysuria and frequency. Neurological: Negative for dizziness and light-headedness. Objective:   /78 (Site: Left Upper Arm, Cuff Size: Medium Adult)   Pulse 75   Temp 98.6 °F (37 °C)   Resp 16   Ht 5' 6.75\" (1.695 m)   Wt 158 lb (71.7 kg)   LMP 10/05/2021   SpO2 99%   Breastfeeding No   BMI 24.93 kg/m²     Physical Exam  Constitutional:       General: She is not in acute distress. Appearance: She is not diaphoretic. Cardiovascular:      Rate and Rhythm: Normal rate and regular rhythm. Pulses: Normal pulses. Heart sounds: Normal heart sounds, S1 normal and S2 normal.   Pulmonary:      Effort: Pulmonary effort is normal. No respiratory distress. Breath sounds: Normal breath sounds. Abdominal:      Tenderness: There is no abdominal tenderness.    Genitourinary:     Comments: Normal female external genitalia  Speculum applied and cervix located, normal vaginal wall mucosa   Brush applied to the cervix and specimen deposited into liquid preservative, mild bleeding noted  Bimanual examination:   No TTP, cervical external os closed, no CMT b/l, no masses palpated, uterus is ballotable      Assessment:       Diagnosis Orders   1. Encounter for gynecological examination  Pap Smear       Plan:      Orders Placed This Encounter   Procedures    Pap Smear     Patient History:    Patient's last menstrual period was 10/05/2021. OBGYN Status: Having periods  Past Surgical History:  No date: TUBAL LIGATION      Social History    Tobacco Use      Smoking status: Never Smoker      Smokeless tobacco: Never Used       Standing Status:   Future     Standing Expiration Date:   10/13/2022     Order Specific Question:   Collection Type     Answer: Thin Prep     Order Specific Question:   Prior Abnormal Pap Test     Answer:   No     Order Specific Question:   Screening or Diagnostic     Answer:   Screening     Order Specific Question:   HPV Requested? Answer:   Yes     Order Specific Question:   High Risk Patient     Answer:   N/A     No orders of the defined types were placed in this encounter. No follow-ups on file.

## 2021-10-19 ENCOUNTER — HOSPITAL ENCOUNTER (OUTPATIENT)
Dept: WOMENS IMAGING | Age: 41
Discharge: HOME | End: 2021-10-21
Payer: COMMERCIAL

## 2021-10-19 VITALS — BODY MASS INDEX: 24.93 KG/M2 | HEIGHT: 67 IN

## 2021-10-19 DIAGNOSIS — Z12.39 SCREENING BREAST EXAMINATION: ICD-10-CM

## 2021-10-19 PROCEDURE — 77067 SCR MAMMO BI INCL CAD: CPT

## 2021-10-21 ENCOUNTER — HOSPITAL ENCOUNTER (OUTPATIENT)
Age: 41
Setting detail: OUTPATIENT SURGERY
Discharge: HOME | End: 2021-10-21
Attending: INTERNAL MEDICINE | Admitting: INTERNAL MEDICINE
Payer: COMMERCIAL

## 2021-10-21 ENCOUNTER — ANESTHESIA EVENT (OUTPATIENT)
Dept: OPERATING ROOM | Age: 41
End: 2021-10-21
Payer: COMMERCIAL

## 2021-10-21 ENCOUNTER — ANESTHESIA (OUTPATIENT)
Dept: OPERATING ROOM | Age: 41
End: 2021-10-21
Payer: COMMERCIAL

## 2021-10-21 VITALS
DIASTOLIC BLOOD PRESSURE: 67 MMHG | HEART RATE: 58 BPM | OXYGEN SATURATION: 100 % | RESPIRATION RATE: 18 BRPM | TEMPERATURE: 97.9 F | WEIGHT: 158 LBS | HEIGHT: 66 IN | BODY MASS INDEX: 25.39 KG/M2 | SYSTOLIC BLOOD PRESSURE: 120 MMHG

## 2021-10-21 VITALS
SYSTOLIC BLOOD PRESSURE: 73 MMHG | DIASTOLIC BLOOD PRESSURE: 45 MMHG | OXYGEN SATURATION: 100 % | RESPIRATION RATE: 24 BRPM

## 2021-10-21 LAB — HCG(URINE) PREGNANCY TEST: NEGATIVE

## 2021-10-21 PROCEDURE — 7100000011 HC PHASE II RECOVERY - ADDTL 15 MIN: Performed by: INTERNAL MEDICINE

## 2021-10-21 PROCEDURE — 6360000002 HC RX W HCPCS: Performed by: NURSE ANESTHETIST, CERTIFIED REGISTERED

## 2021-10-21 PROCEDURE — 45380 COLONOSCOPY AND BIOPSY: CPT | Performed by: INTERNAL MEDICINE

## 2021-10-21 PROCEDURE — 6370000000 HC RX 637 (ALT 250 FOR IP): Performed by: INTERNAL MEDICINE

## 2021-10-21 PROCEDURE — 2709999900 HC NON-CHARGEABLE SUPPLY: Performed by: INTERNAL MEDICINE

## 2021-10-21 PROCEDURE — 84703 CHORIONIC GONADOTROPIN ASSAY: CPT

## 2021-10-21 PROCEDURE — 3700000000 HC ANESTHESIA ATTENDED CARE: Performed by: INTERNAL MEDICINE

## 2021-10-21 PROCEDURE — 3609027000 HC COLONOSCOPY: Performed by: INTERNAL MEDICINE

## 2021-10-21 PROCEDURE — 3700000001 HC ADD 15 MINUTES (ANESTHESIA): Performed by: INTERNAL MEDICINE

## 2021-10-21 PROCEDURE — 88305 TISSUE EXAM BY PATHOLOGIST: CPT

## 2021-10-21 PROCEDURE — 7100000010 HC PHASE II RECOVERY - FIRST 15 MIN: Performed by: INTERNAL MEDICINE

## 2021-10-21 PROCEDURE — 2580000003 HC RX 258: Performed by: INTERNAL MEDICINE

## 2021-10-21 RX ORDER — PROPOFOL 10 MG/ML
INJECTION, EMULSION INTRAVENOUS PRN
Status: DISCONTINUED | OUTPATIENT
Start: 2021-10-21 | End: 2021-10-21 | Stop reason: SDUPTHER

## 2021-10-21 RX ORDER — MAGNESIUM HYDROXIDE 1200 MG/15ML
LIQUID ORAL PRN
Status: DISCONTINUED | OUTPATIENT
Start: 2021-10-21 | End: 2021-10-21 | Stop reason: ALTCHOICE

## 2021-10-21 RX ORDER — LIDOCAINE HYDROCHLORIDE 10 MG/ML
INJECTION, SOLUTION INFILTRATION; PERINEURAL PRN
Status: DISCONTINUED | OUTPATIENT
Start: 2021-10-21 | End: 2021-10-21

## 2021-10-21 RX ORDER — SIMETHICONE 20 MG/.3ML
EMULSION ORAL PRN
Status: DISCONTINUED | OUTPATIENT
Start: 2021-10-21 | End: 2021-10-21 | Stop reason: ALTCHOICE

## 2021-10-21 RX ORDER — PROPOFOL 10 MG/ML
INJECTION, EMULSION INTRAVENOUS PRN
Status: DISCONTINUED | OUTPATIENT
Start: 2021-10-21 | End: 2021-10-21

## 2021-10-21 RX ORDER — SODIUM CHLORIDE, SODIUM LACTATE, POTASSIUM CHLORIDE, CALCIUM CHLORIDE 600; 310; 30; 20 MG/100ML; MG/100ML; MG/100ML; MG/100ML
INJECTION, SOLUTION INTRAVENOUS CONTINUOUS
Status: DISCONTINUED | OUTPATIENT
Start: 2021-10-21 | End: 2021-10-21 | Stop reason: HOSPADM

## 2021-10-21 RX ADMIN — PROPOFOL 100 MG: 10 INJECTION, EMULSION INTRAVENOUS at 08:08

## 2021-10-21 RX ADMIN — PROPOFOL 50 MG: 10 INJECTION, EMULSION INTRAVENOUS at 08:13

## 2021-10-21 RX ADMIN — SODIUM CHLORIDE, POTASSIUM CHLORIDE, SODIUM LACTATE AND CALCIUM CHLORIDE: 600; 310; 30; 20 INJECTION, SOLUTION INTRAVENOUS at 07:23

## 2021-10-21 ASSESSMENT — PULMONARY FUNCTION TESTS
PIF_VALUE: 1

## 2021-10-21 NOTE — ANESTHESIA PRE PROCEDURE
Department of Anesthesiology  Preprocedure Note       Name:  Jeremy Yost   Age:  42 y.o.  :  1980                                          MRN:  [id number]         Date:  10/21/2021      Surgeon: Ryan Garcia):  Justo Walker MD    Procedure: Procedure(s):  COLORECTAL CANCER SCREENING, HIGH RISK    Medications prior to admission:   Prior to Admission medications    Medication Sig Start Date End Date Taking? Authorizing Provider   gabapentin (NEURONTIN) 100 MG capsule Take 1 capsule by mouth 2 times daily for 60 days. Intended supply: 90 days 10/3/21 12/2/21 Yes Franklyn Manjarrez MD   albuterol sulfate HFA (VENTOLIN HFA) 108 (90 Base) MCG/ACT inhaler Inhale 2 puffs into the lungs every 6 hours as needed for Wheezing 3/17/20  Yes Lily Servetas, DO   acetaminophen-codeine (TYLENOL #3) 300-30 MG per tablet  16  Yes Historical Provider, MD   albuterol (PROVENTIL) (2.5 MG/3ML) 0.083% nebulizer solution Take 3 mLs by nebulization every 6 hours as needed for Wheezing 3/17/20   Lily Servetas, DO   Multiple Vitamins-Minerals (MULTIVITAMIN ADULTS PO) Take by mouth daily    Historical Provider, MD   vitamin D (CHOLECALCIFEROL) 1000 UNIT TABS tablet Take 1,000 Units by mouth daily    Historical Provider, MD   Elastic Bandages & Supports ([address] [place] [address]) [facility] Dx: bilateral carpal tunnel 9/15/15   Bob Fuller DO       Current medications:    Current Facility-Administered Medications   Medication Dose Route Frequency Provider Last Rate Last Admin    lactated ringers infusion   IntraVENous Continuous Justo Walker MD           Allergies:     Allergies   Allergen Reactions    Latex Hives    Bismuth Subsalicylate Hives    Antibacterial Hand Soap [Triclosan] Hives    Kiwi Extract Hives    Strawberry Extract Hives    Viibryd [Vilazodone Hcl] Hives    Effexor [Venlafaxine] Rash       Problem List:    Patient Active Problem List   Diagnosis Code    Asthma J45.909    Allergic rhinitis J30.9  Chronic ear infection H66.90    Depressed F32. A    Fainting R55    Headache R51.9    Scoliosis M41.9    Vertigo R42    Vision loss H54.7       Past Medical History:        Diagnosis Date    Allergic rhinitis     Asthma     Chronic ear infection     Depressed     Double, Major and Dystemic    Fainting     since age 14    Headache     Scoliosis     Vertigo     Vision loss     poor perpherial vision, doesn't drive       Past Surgical History:        Procedure Laterality Date    TUBAL LIGATION         Social History:    Social History     Tobacco Use    Smoking status: Never Smoker    Smokeless tobacco: Never Used   Substance Use Topics    Alcohol use: Yes     Comment: rarely                                Counseling given: Not Answered      Vital Signs (Current):   Vitals:    10/21/21 0701   BP: 115/69   Pulse: 78   Resp: 16   Temp: 36.6 °C (97.9 °F)   TempSrc: Temporal   SpO2: 100%   Weight: 158 lb (71.7 kg)   Height: 5' 6\" (1.676 m)                                              BP Readings from Last 3 Encounters:   10/21/21 115/69   10/13/21 110/78   09/29/21 122/70       NPO Status: Time of last liquid consumption: 2355                        Time of last solid consumption: 1730                        Date of last liquid consumption: 10/20/21                        Date of last solid food consumption: 10/19/21    BMI:   Wt Readings from Last 3 Encounters:   10/21/21 158 lb (71.7 kg)   10/13/21 158 lb (71.7 kg)   09/29/21 158 lb 14.4 oz (72.1 kg)     Body mass index is 25.5 kg/m².     CBC:   Lab Results   Component Value Date    WBC 12.8 04/03/2012    RBC 4.94 04/03/2012    HGB 13.7 04/03/2012    HCT 41.7 04/03/2012    MCV 84.4 04/03/2012    RDW 13.9 04/03/2012     04/03/2012       CMP:   Lab Results   Component Value Date     09/19/2019    K 4.1 09/19/2019     09/19/2019    CO2 27 04/03/2012    BUN 11 04/03/2012    CREATININE 0.61 09/19/2019    GFRAA >60 09/19/2019    LABGLOM >60 09/19/2019    GLUCOSE 89 09/19/2019    PROT 7.7 09/19/2019    CALCIUM 9.9 09/19/2019    BILITOT 1.0 09/19/2019    ALKPHOS 53 09/19/2019    AST 44 09/19/2019    ALT 82 09/19/2019       POC Tests: No results for input(s): POCGLU, POCNA, POCK, POCCL, POCBUN, POCHEMO, POCHCT in the last 72 hours. Coags: No results found for: PROTIME, INR, APTT    HCG (If Applicable):   Lab Results   Component Value Date    PREGTESTUR Negative 10/21/2021        ABGs: No results found for: PHART, PO2ART, SIZ3NKQ, UGQ1TYU, BEART, A8ETUQKR     Type & Screen (If Applicable):  No results found for: LABABO, LABRH    Drug/Infectious Status (If Applicable):  No results found for: HIV, HEPCAB    COVID-19 Screening (If Applicable): No results found for: COVID19        Anesthesia Evaluation  Patient summary reviewed no history of anesthetic complications:   Airway: Mallampati: II  TM distance: >3 FB   Neck ROM: full  Mouth opening: > = 3 FB Dental: normal exam         Pulmonary:normal exam    (+) asthma:                            Cardiovascular:Negative CV ROS                      Neuro/Psych:   (+) headaches: migraine headaches, psychiatric history: stable with treatment            GI/Hepatic/Renal:   (+) bowel prep,           Endo/Other: Negative Endo/Other ROS                    Abdominal:             Vascular: negative vascular ROS. Other Findings:             Anesthesia Plan      MAC     ASA 2       Induction: intravenous. Anesthetic plan and risks discussed with patient. Use of blood products discussed with patient whom consented to blood products. Plan discussed with attending.                   GREGORY Castañeda - CRNA   10/21/2021

## 2021-10-21 NOTE — H&P
Patient Name: Jeremy Yost  : 1980  MRN: [id number]  DATE: 10/21/21      ENDOSCOPY  History and Physical    Procedure:    [] Diagnostic Colonoscopy       [x] Screening Colonoscopy  [] EGD      [] ERCP      [] EUS       [] Other    [x] Previous office notes/History and Physical reviewed from the patients chart. Please see EMR for further details of HPI. I have examined the patient's status immediately prior to the procedure and:      Indications/HPI:    []Abdominal Pain   []Cancer- GI/Lung  [x]Fhx of colon CA/polyps  []History of Polyps   []Thayers   []Melena  []Abnormal Imaging   []Dysphagia    []Persistent Pneumonia  []Anemia   []Food Impaction  []History of Polyps  []GI Bleed   []Pulmonary nodule/Mass  []Change in bowel habits  []Heartburn/Reflux  []Rectal Bleed (BRBPR)  []Chest Pain - Non Cardiac  []Heme (+) Stool  []Ulcers  []Constipation   []Hemoptysis   []Varices  []Diarrhea   []Hypoxemia  []Nausea/Vomiting   []Screening   []Crohns/Colitis  []Other:    Anesthesia:   [x] MAC [] Moderate Sedation   [] General   [] None     ROS: 12 pt Review of Symptoms was negative unless mentioned above    Medications:   Prior to Admission medications    Medication Sig Start Date End Date Taking? Authorizing Provider   gabapentin (NEURONTIN) 100 MG capsule Take 1 capsule by mouth 2 times daily for 60 days.  Intended supply: 90 days 10/3/21 12/2/21 Yes Franklyn Manjarrez MD   albuterol sulfate HFA (VENTOLIN HFA) 108 (90 Base) MCG/ACT inhaler Inhale 2 puffs into the lungs every 6 hours as needed for Wheezing 3/17/20  Yes Lily Servetas, DO   acetaminophen-codeine (TYLENOL #3) 300-30 MG per tablet  16  Yes Historical Provider, MD   albuterol (PROVENTIL) (2.5 MG/3ML) 0.083% nebulizer solution Take 3 mLs by nebulization every 6 hours as needed for Wheezing 3/17/20   Lily Servetas, DO   Multiple Vitamins-Minerals (MULTIVITAMIN ADULTS PO) Take by mouth daily    Historical Provider, MD   vitamin D (CHOLECALCIFEROL) 1000 UNIT TABS tablet Take 1,000 Units by mouth daily    Historical Provider, MD   Elastic Bandages & Supports ([facility]) [facility] Dx: bilateral carpal tunnel 9/15/15   Martha Petty DO       Allergies: Allergies   Allergen Reactions    Latex Hives    Bismuth Subsalicylate Hives    Antibacterial Hand Soap [Triclosan] Hives    Kiwi Extract Hives    Strawberry Extract Hives    Viibryd [Vilazodone Hcl] Hives    Effexor [Venlafaxine] Rash        History of allergic reaction to anesthesia:  No    Past Medical History:  Past Medical History:   Diagnosis Date    Allergic rhinitis     Asthma     Chronic ear infection     Depressed     Double, Major and Dystemic    Fainting     since age 14    Headache     Scoliosis     Vertigo     Vision loss     poor perpherial vision, doesn't drive       Past Surgical History:  Past Surgical History:   Procedure Laterality Date    TUBAL LIGATION         Social History:  Social History     Tobacco Use    Smoking status: Never Smoker    Smokeless tobacco: Never Used   Vaping Use    Vaping Use: Never used   Substance Use Topics    Alcohol use: Yes     Comment: rarely    Drug use: No       Vital Signs:   Vitals:    10/21/21 0701   BP: 115/69   Pulse: 78   Resp: 16   Temp: 97.9 °F (36.6 °C)   SpO2: 100%        Physical Exam:  Cardiac:  [x]WNL  []Comments:  Pulmonary:  [x]WNL   []Comments:   Neuro/Mental Status:  [x]WNL  []Comments:  Abdominal:  [x]WNL    []Comments:  Other:   []WNL  []Comments:    Informed Consent:  The risks and benefits of the procedure have been discussed with either the patient or if they cannot consent, their representative. Assessment:  Patient examined and appropriate for planned sedation and procedure. Plan:  Proceed with planned sedation and procedure as above.     Emir Rudolph MD  7:41 AM

## 2021-12-05 NOTE — RESULT ENCOUNTER NOTE
12/5/2021  [id number] [place] [address]    Dear Harley Lopez,    Your colonoscopy reveled a growth on the large intestine (Colon) called a polyp. A polyp could be a \"precancerous\" growth, which means that with time it could turn into cancer. Polyps were removed during your procedure. As instructed after your colonoscopy, recommendations suggest a follow up colonoscopy in 5 years    We hope you are doing well, and if you have any questions please contact me at [phone]. Thank you for choosing [address] [place] [address] for your healthcare.     Sincerely,     Roshan Cobb MD

## 2021-12-08 DIAGNOSIS — J45.41 MODERATE PERSISTENT ASTHMA WITH ACUTE EXACERBATION: ICD-10-CM

## 2021-12-08 RX ORDER — ALBUTEROL SULFATE 90 UG/1
2 AEROSOL, METERED RESPIRATORY (INHALATION) EVERY 6 HOURS PRN
Qty: 2 EACH | Refills: 3 | Status: SHIPPED | OUTPATIENT
Start: 2021-12-08

## 2021-12-10 DIAGNOSIS — M54.12 CERVICAL RADICULOPATHY: ICD-10-CM

## 2021-12-10 NOTE — TELEPHONE ENCOUNTER
She has not seen rheumatology yet, no one has called to schedule her. I gave referral to the front  Desk for someone to get her scheduled. Requested Prescriptions     Pending Prescriptions Disp Refills    gabapentin (NEURONTIN) 100 MG capsule 120 capsule 1     Sig: Take 1 capsule by mouth 2 times daily for 60 days.  Intended supply: 90 days

## 2021-12-11 RX ORDER — GABAPENTIN 100 MG/1
100 CAPSULE ORAL 2 TIMES DAILY
Qty: 120 CAPSULE | Refills: 1 | Status: SHIPPED | OUTPATIENT
Start: 2021-12-11 | End: 2022-03-09 | Stop reason: SDUPTHER

## 2022-03-09 DIAGNOSIS — M54.12 CERVICAL RADICULOPATHY: ICD-10-CM

## 2022-03-09 DIAGNOSIS — R11.0 NAUSEA: Primary | ICD-10-CM

## 2022-03-09 RX ORDER — GABAPENTIN 100 MG/1
100 CAPSULE ORAL 2 TIMES DAILY
Qty: 180 CAPSULE | Refills: 3 | Status: SHIPPED | OUTPATIENT
Start: 2022-03-09 | End: 2022-03-11 | Stop reason: SDUPTHER

## 2022-03-09 RX ORDER — ONDANSETRON 4 MG/1
4 TABLET, ORALLY DISINTEGRATING ORAL EVERY 8 HOURS PRN
Qty: 20 TABLET | Refills: 1 | Status: SHIPPED | OUTPATIENT
Start: 2022-03-09 | End: 2022-04-12

## 2022-03-11 DIAGNOSIS — M54.12 CERVICAL RADICULOPATHY: ICD-10-CM

## 2022-03-11 RX ORDER — GABAPENTIN 100 MG/1
100 CAPSULE ORAL 2 TIMES DAILY
Qty: 180 CAPSULE | Refills: 3 | Status: SHIPPED | OUTPATIENT
Start: 2022-03-11 | End: 2022-10-01

## 2022-03-11 NOTE — TELEPHONE ENCOUNTER
Patient requesting ,edication refill for Gabapentin 100mg. Needs Rx to got to [facility] in [place].  Patient walk in office

## 2022-04-12 ENCOUNTER — OFFICE VISIT (OUTPATIENT)
Dept: PRIMARY CARE CLINIC | Age: 42
End: 2022-04-12
Payer: COMMERCIAL

## 2022-04-12 VITALS
WEIGHT: 160 LBS | OXYGEN SATURATION: 97 % | HEIGHT: 66 IN | TEMPERATURE: 97.9 F | DIASTOLIC BLOOD PRESSURE: 68 MMHG | RESPIRATION RATE: 18 BRPM | HEART RATE: 105 BPM | BODY MASS INDEX: 25.71 KG/M2 | SYSTOLIC BLOOD PRESSURE: 118 MMHG

## 2022-04-12 DIAGNOSIS — Z01.419 ENCOUNTER FOR GYNECOLOGICAL EXAMINATION: ICD-10-CM

## 2022-04-12 DIAGNOSIS — R91.1 PULMONARY NODULE: ICD-10-CM

## 2022-04-12 DIAGNOSIS — J45.41 MODERATE PERSISTENT ASTHMA WITH ACUTE EXACERBATION: ICD-10-CM

## 2022-04-12 DIAGNOSIS — Z01.419 ENCOUNTER FOR GYNECOLOGICAL EXAMINATION: Primary | ICD-10-CM

## 2022-04-12 PROCEDURE — 99396 PREV VISIT EST AGE 40-64: CPT | Performed by: INTERNAL MEDICINE

## 2022-04-12 PROCEDURE — 99213 OFFICE O/P EST LOW 20 MIN: CPT | Performed by: INTERNAL MEDICINE

## 2022-04-12 RX ORDER — VORTIOXETINE 10 MG/1
TABLET, FILM COATED ORAL
COMMUNITY
Start: 2022-02-24

## 2022-04-12 RX ORDER — TRAZODONE HYDROCHLORIDE 100 MG/1
TABLET ORAL
COMMUNITY
Start: 2022-01-24

## 2022-04-12 RX ORDER — PRAZOSIN HYDROCHLORIDE 1 MG/1
CAPSULE ORAL
COMMUNITY
Start: 2022-02-24

## 2022-04-12 ASSESSMENT — ENCOUNTER SYMPTOMS
COUGH: 0
VOMITING: 0
ABDOMINAL PAIN: 0
SHORTNESS OF BREATH: 0
DIARRHEA: 0
NAUSEA: 0
WHEEZING: 0

## 2022-04-12 ASSESSMENT — PATIENT HEALTH QUESTIONNAIRE - PHQ9
7. TROUBLE CONCENTRATING ON THINGS, SUCH AS READING THE NEWSPAPER OR WATCHING TELEVISION: 0
5. POOR APPETITE OR OVEREATING: 0
9. THOUGHTS THAT YOU WOULD BE BETTER OFF DEAD, OR OF HURTING YOURSELF: 0
SUM OF ALL RESPONSES TO PHQ QUESTIONS 1-9: 0
SUM OF ALL RESPONSES TO PHQ QUESTIONS 1-9: 0
8. MOVING OR SPEAKING SO SLOWLY THAT OTHER PEOPLE COULD HAVE NOTICED. OR THE OPPOSITE, BEING SO FIGETY OR RESTLESS THAT YOU HAVE BEEN MOVING AROUND A LOT MORE THAN USUAL: 0
3. TROUBLE FALLING OR STAYING ASLEEP: 0
2. FEELING DOWN, DEPRESSED OR HOPELESS: 0
SUM OF ALL RESPONSES TO PHQ QUESTIONS 1-9: 0
SUM OF ALL RESPONSES TO PHQ9 QUESTIONS 1 & 2: 0
10. IF YOU CHECKED OFF ANY PROBLEMS, HOW DIFFICULT HAVE THESE PROBLEMS MADE IT FOR YOU TO DO YOUR WORK, TAKE CARE OF THINGS AT HOME, OR GET ALONG WITH OTHER PEOPLE: 0
1. LITTLE INTEREST OR PLEASURE IN DOING THINGS: 0
6. FEELING BAD ABOUT YOURSELF - OR THAT YOU ARE A FAILURE OR HAVE LET YOURSELF OR YOUR FAMILY DOWN: 0
SUM OF ALL RESPONSES TO PHQ QUESTIONS 1-9: 0
4. FEELING TIRED OR HAVING LITTLE ENERGY: 0

## 2022-04-12 ASSESSMENT — COLUMBIA-SUICIDE SEVERITY RATING SCALE - C-SSRS
2. HAVE YOU ACTUALLY HAD ANY THOUGHTS OF KILLING YOURSELF?: NO
6. HAVE YOU EVER DONE ANYTHING, STARTED TO DO ANYTHING, OR PREPARED TO DO ANYTHING TO END YOUR LIFE?: NO
1. WITHIN THE PAST MONTH, HAVE YOU WISHED YOU WERE DEAD OR WISHED YOU COULD GO TO SLEEP AND NOT WAKE UP?: NO

## 2022-04-20 LAB
HPV COMMENT: NORMAL
HPV TYPE 16: NOT DETECTED
HPV TYPE 18: NOT DETECTED
HPVOH (OTHER TYPES): NOT DETECTED

## 2022-04-26 ENCOUNTER — HOSPITAL ENCOUNTER (OUTPATIENT)
Dept: CT IMAGING | Age: 42
Discharge: HOME | End: 2022-04-28
Payer: COMMERCIAL

## 2022-04-26 DIAGNOSIS — R91.1 PULMONARY NODULE: ICD-10-CM

## 2022-04-26 PROCEDURE — 71250 CT THORAX DX C-: CPT

## 2022-05-11 ENCOUNTER — OFFICE VISIT (OUTPATIENT)
Dept: FAMILY MEDICINE CLINIC | Age: 42
End: 2022-05-11
Payer: COMMERCIAL

## 2022-05-11 VITALS
BODY MASS INDEX: 25.71 KG/M2 | OXYGEN SATURATION: 98 % | SYSTOLIC BLOOD PRESSURE: 120 MMHG | WEIGHT: 160 LBS | DIASTOLIC BLOOD PRESSURE: 60 MMHG | TEMPERATURE: 97.1 F | HEART RATE: 76 BPM | HEIGHT: 66 IN

## 2022-05-11 DIAGNOSIS — M54.42 ACUTE BILATERAL LOW BACK PAIN WITH LEFT-SIDED SCIATICA: Primary | ICD-10-CM

## 2022-05-11 PROCEDURE — 99213 OFFICE O/P EST LOW 20 MIN: CPT | Performed by: NURSE PRACTITIONER

## 2022-05-11 RX ORDER — PREDNISONE 50 MG/1
50 TABLET ORAL DAILY
Qty: 5 TABLET | Refills: 0 | Status: SHIPPED | OUTPATIENT
Start: 2022-05-11 | End: 2022-05-16

## 2022-05-11 RX ORDER — BACLOFEN 10 MG/1
10 TABLET ORAL 3 TIMES DAILY
Qty: 30 TABLET | Refills: 0 | Status: SHIPPED | OUTPATIENT
Start: 2022-05-11

## 2022-05-11 ASSESSMENT — ENCOUNTER SYMPTOMS
WHEEZING: 0
SHORTNESS OF BREATH: 0
DIARRHEA: 0
RHINORRHEA: 0
VOMITING: 0
COUGH: 0
NAUSEA: 0
BACK PAIN: 1
SORE THROAT: 0

## 2022-05-11 NOTE — PROGRESS NOTES
Subjective:      Patient ID: Clement Prasad is a 42 y.o. female who presents today for:  Chief Complaint   Patient presents with    Back Pain     Patient pulled back out on mothers day pain in lower back running down legs  pain more on left than right.        HPI      This happened on Sunday   Does feel that its slightly getting better and she can stand now   She was lifting her mattress and pulled out her back  Her lower back   mainly in the middle   Going down both legs  She has pulled her back out like this before a couple times   She has scoliosis   No loss of bowl or bladder function   Down the left leg mostly   Always the left side is worse   No fall   She feels imaging really isnt necessary             Past Medical History:   Diagnosis Date    Allergic rhinitis     Asthma     Chronic ear infection     Depressed     Double, Major and Dystemic    Fainting     since age 14    Headache     Scoliosis     Vertigo     Vision loss     poor perpherial vision, doesn't drive     Past Surgical History:   Procedure Laterality Date    COLONOSCOPY N/A 10/21/2021    COLONOSCOPY WITH POLYPECTOMY performed by Blaze [facility]o [facility] at [facility] History     Socioeconomic History    Marital status:      Spouse name: Not on file    Number of children: Not on file    Years of education: Not on file    Highest education level: Not on file   Occupational History    Not on file   Tobacco Use    Smoking status: Never Smoker    Smokeless tobacco: Never Used   Vaping Use    Vaping Use: Never used   Substance and Sexual Activity    Alcohol use: Yes     Comment: rarely    Drug use: No    Sexual activity: Not on file   Other Topics Concern    Not on file   Social History Narrative    Not on file     Social Determinants of Health     Financial Resource Strain: High Risk    Difficulty of Paying Living Expenses: Hard   Food Insecurity: Food Insecurity Present    Worried About Running Out of Food in the Last Year: Often true    Randall of Food in the Last Year: Often true   Transportation Needs:     Lack of Transportation (Medical): Not on file    Lack of Transportation (Non-Medical):  Not on file   Physical Activity:     Days of Exercise per Week: Not on file    Minutes of Exercise per Session: Not on file   Stress:     Feeling of Stress : Not on file   Social Connections:     Frequency of Communication with Friends and Family: Not on file    Frequency of Social Gatherings with Friends and Family: Not on file    Attends Gnosticism Services: Not on file    Active Member of Clubs or Organizations: Not on file    Attends Club or Organization Meetings: Not on file    Marital Status: Not on file   Intimate Partner Violence:     Fear of Current or Ex-Partner: Not on file    Emotionally Abused: Not on file    Physically Abused: Not on file    Sexually Abused: Not on file   Housing Stability:     Unable to Pay for Housing in the Last Year: Not on file    Number of Places Lived in the Last Year: Not on file    Unstable Housing in the Last Year: Not on file     Family History   Problem Relation Age of Onset    Diabetes Mother 15    Arthritis Mother     Kidney Disease Mother         stage 4    Heart Disease Mother         CAD    Other Mother         Neurophathy, eye problems    Clotting Disorder Mother         Brain    Asthma Father     Diabetes Sister         due to mom DM    Alcohol Abuse Neg Hx     Allergy (Severe) Neg Hx     Anemia Neg Hx     Arrhythmia Neg Hx     Atrial Fibrillation Neg Hx     Birth Defects Neg Hx     Breast Cancer Neg Hx     Cancer Neg Hx     Coronary Art Dis Neg Hx     Colon Cancer Neg Hx     Depression Neg Hx     Early Death Neg Hx     Heart Attack Neg Hx     Hearing Loss Neg Hx     High Blood Pressure Neg Hx     High Cholesterol Neg Hx     Learning Disabilities Neg Hx     Mental Illness Neg Hx     Mental Retardation Neg Hx     Miscarriages / Stillbirths Neg Hx     Obesity Neg Hx     Osteoporosis Neg Hx     Prostate Cancer Neg Hx     Stroke Neg Hx     Substance Abuse Neg Hx     Vision Loss Neg Hx      Allergies   Allergen Reactions    Latex Hives    Bismuth Subsalicylate Hives    Antibacterial Hand Soap [Triclosan] Hives    Kiwi Extract Hives    Strawberry Extract Hives    Viibryd [Vilazodone Hcl] Hives    Effexor [Venlafaxine] Rash     Current Outpatient Medications   Medication Sig Dispense Refill    predniSONE (DELTASONE) 50 MG tablet Take 1 tablet by mouth daily for 5 days 5 tablet 0    baclofen (LIORESAL) 10 MG tablet Take 1 tablet by mouth 3 times daily 30 tablet 0    prazosin (MINIPRESS) 1 MG capsule TAKE 1 CAPSULE BY MOUTH AT BEDTIME      traZODone (DESYREL) 100 MG tablet TAKE 1 TABLET BY MOUTH AT BEDTIME AS NEEDED FOR SLEEP      TRINTELLIX 10 MG TABS tablet TAKE 1 TABLET BY MOUTH EVERY DAY      gabapentin (NEURONTIN) 100 MG capsule Take 1 capsule by mouth 2 times daily for 90 days. Intended supply: 90 days 180 capsule 3    albuterol sulfate HFA (VENTOLIN HFA) 108 (90 Base) MCG/ACT inhaler Inhale 2 puffs into the lungs every 6 hours as needed for Wheezing or Shortness of Breath 2 each 3    albuterol (PROVENTIL) (2.5 MG/3ML) 0.083% nebulizer solution Take 3 mLs by nebulization every 6 hours as needed for Wheezing 120 each 3    Multiple Vitamins-Minerals (MULTIVITAMIN ADULTS PO) Take by mouth daily      vitamin D (CHOLECALCIFEROL) 1000 UNIT TABS tablet Take 1,000 Units by mouth daily      Elastic Bandages & Supports (NEXCARE CARPAL TUNNEL BRACE) MISC Dx: bilateral carpal tunnel 2 each 0     No current facility-administered medications for this visit. Review of Systems   Constitutional: Negative for chills, fatigue and fever. HENT: Negative for congestion, rhinorrhea and sore throat. Respiratory: Negative for cough, shortness of breath and wheezing.     Gastrointestinal: Negative for diarrhea, nausea and vomiting. Musculoskeletal: Positive for back pain and gait problem. Negative for myalgias. Skin: Negative for rash. Neurological: Negative for headaches. Psychiatric/Behavioral: Negative for agitation, confusion and hallucinations. Objective:   /60   Pulse 76   Temp 97.1 °F (36.2 °C) (Infrared)   Ht 5' 6\" (1.676 m)   Wt 160 lb (72.6 kg)   SpO2 98%   BMI 25.82 kg/m²     Physical Exam  Vitals reviewed. Constitutional:       Appearance: Normal appearance. HENT:      Head: Normocephalic and atraumatic. Nose: Nose normal.      Mouth/Throat:      Lips: Pink. Eyes:      General: Lids are normal.      Conjunctiva/sclera: Conjunctivae normal.   Cardiovascular:      Rate and Rhythm: Normal rate. Pulmonary:      Effort: Pulmonary effort is normal.   Musculoskeletal:      Cervical back: Normal range of motion. Lumbar back: No swelling. Decreased range of motion. Back:       Comments: Tender to light touch, decreased ROM    Skin:     General: Skin is warm and dry. Neurological:      General: No focal deficit present. Mental Status: She is alert and oriented to person, place, and time. Psychiatric:         Mood and Affect: Mood normal.         Behavior: Behavior normal. Behavior is cooperative. Assessment:       Diagnosis Orders   1. Acute bilateral low back pain with left-sided sciatica  predniSONE (DELTASONE) 50 MG tablet    baclofen (LIORESAL) 10 MG tablet     No results found for this visit on 05/11/22. Plan:     Assessment & Plan   Francis Leonardo was seen today for back pain. Diagnoses and all orders for this visit:    Acute bilateral low back pain with left-sided sciatica  -     predniSONE (DELTASONE) 50 MG tablet; Take 1 tablet by mouth daily for 5 days  -     baclofen (LIORESAL) 10 MG tablet; Take 1 tablet by mouth 3 times daily      No orders of the defined types were placed in this encounter.     Orders Placed This Encounter   Medications    predniSONE (DELTASONE) 50 MG tablet     Sig: Take 1 tablet by mouth daily for 5 days     Dispense:  5 tablet     Refill:  0    baclofen (LIORESAL) 10 MG tablet     Sig: Take 1 tablet by mouth 3 times daily     Dispense:  30 tablet     Refill:  0     There are no discontinued medications. Return for Follow up with PCP. Reviewed with the patient/family: current clinical status & medications. Side effects of the medication prescribed today, as well as treatment plan/rationale and result expectations have been discussed with the patient/family who expresses understanding. Patient will be discharged home in stable condition. Follow up with PCP to evaluate treatment results or return if symptoms worsen or fail to improve. Discussed signs and symptoms which require immediate follow-up in ED/call to 911. Understanding verbalized. I have reviewed the patient's medical history in detail and updated the computerized patient record.     Giulia Crump, GREGORY - CNP

## 2022-05-11 NOTE — PATIENT INSTRUCTIONS
Patient Education        Back Pain: Care Instructions  Your Care Instructions     Back pain has many possible causes. It is often related to problems with muscles and ligaments of the back. It may also be related to problems with the nerves, discs, or bones of the back. Moving, lifting, standing, sitting, or sleeping in an awkward way can strain the back. Sometimes you don't notice theinjury until later. Arthritis is another common cause of back pain. Although it may hurt a lot, back pain usually improves on its own within several weeks. Most people recover in 12 weeks or less. Using good home treatment and being careful not to stress your back can help you feel bettersooner. Follow-up care is a key part of your treatment and safety. Be sure to make and go to all appointments, and call your doctor if you are having problems. It's also a good idea to know your test results and keep alist of the medicines you take. How can you care for yourself at home?  Sit or lie in positions that are most comfortable and reduce your pain. Try one of these positions when you lie down:  ? Lie on your back with your knees bent and supported by large pillows. ? Lie on the floor with your legs on the seat of a sofa or chair. ? Lie on your side with your knees and hips bent and a pillow between your legs. ? Lie on your stomach if it does not make pain worse.  Do not sit up in bed, and avoid soft couches and twisted positions. Bed rest can help relieve pain at first, but it delays healing. Avoid bed rest after the first day of back pain.  Change positions every 30 minutes. If you must sit for long periods of time, take breaks from sitting. Get up and walk around, or lie in a comfortable position.  Try using a heating pad on a low or medium setting for 15 to 20 minutes every 2 or 3 hours. Try a warm shower in place of one session with the heating pad.  You can also try an ice pack for 10 to 15 minutes every 2 to 3 hours. Put a thin cloth between the ice pack and your skin.  Take pain medicines exactly as directed. ? If the doctor gave you a prescription medicine for pain, take it as prescribed. ? If you are not taking a prescription pain medicine, ask your doctor if you can take an over-the-counter medicine.  Take short walks several times a day. You can start with 5 to 10 minutes, 3 or 4 times a day, and work up to longer walks. Walk on level surfaces and avoid hills and stairs until your back is better.  Return to work and other activities as soon as you can. Continued rest without activity is usually not good for your back.  To prevent future back pain, do exercises to stretch and strengthen your back and stomach. Learn how to use good posture, safe lifting techniques, and proper body mechanics. When should you call for help? Call your doctor now or seek immediate medical care if:     You have new or worsening numbness in your legs.      You have new or worsening weakness in your legs. (This could make it hard to stand up.)      You lose control of your bladder or bowels. Watch closely for changes in your health, and be sure to contact your doctor if:     You have a fever, lose weight, or don't feel well.      You do not get better as expected. Where can you learn more? Go to https://Peer39.Vingle. org and sign in to your INVOLTA account. Enter S484 in the Akippa box to learn more about \"Back Pain: Care Instructions. \"     If you do not have an account, please click on the \"Sign Up Now\" link. Current as of: July 1, 2021               Content Version: 13.2  © 2006-2022 Healthwise, Incorporated. Care instructions adapted under license by [facility] ([facility]). If you have questions about a medical condition or this instruction, always ask your healthcare professional. [place] [address] any warranty or liability for your use of this information.          Patient Education        Low Back Pain: Exercises  Introduction  Here are some examples of exercises for you to try. The exercises may be suggested for a condition or for rehabilitation. Start each exercise slowly. Ease off the exercises if you start to have pain. You will be told when to start these exercises and which ones will work bestfor you. How to do the exercises  Press-up    1. Lie on your stomach, supporting your body with your forearms. 2. Press your elbows down into the floor to raise your upper back. As you do this, relax your stomach muscles and allow your back to arch without using your back muscles. As your press up, do not let your hips or pelvis come off the floor. 3. Hold for 15 to 30 seconds, then relax. 4. Repeat 2 to 4 times. Alternate arm and leg (bird dog) exercise    Do this exercise slowly. Try to keep your body straight at all times, and donot let one hip drop lower than the other. 1. Start on the floor, on your hands and knees. 2. Tighten your belly muscles. 3. Raise one leg off the floor, and hold it straight out behind you. Be careful not to let your hip drop down, because that will twist your trunk. 4. Hold for about 6 seconds, then lower your leg and switch to the other leg. 5. Repeat 8 to 12 times on each leg. 6. Over time, work up to holding for 10 to 30 seconds each time. 7. If you feel stable and secure with your leg raised, try raising the opposite arm straight out in front of you at the same time. Knee-to-chest exercise    1. Lie on your back with your knees bent and your feet flat on the floor. 2. Bring one knee to your chest, keeping the other foot flat on the floor (or keeping the other leg straight, whichever feels better on your lower back). 3. Keep your lower back pressed to the floor. Hold for at least 15 to 30 seconds. 4. Relax, and lower the knee to the starting position. 5. Repeat with the other leg. Repeat 2 to 4 times with each leg.   6. To get more stretch, put your other leg flat on the floor while pulling your knee to your chest.  Curl-ups    1. Lie on the floor on your back with your knees bent at a 90-degree angle. Your feet should be flat on the floor, about 12 inches from your buttocks. 2. Cross your arms over your chest. If this bothers your neck, try putting your hands behind your neck (not your head), with your elbows spread apart. 3. Slowly tighten your belly muscles and raise your shoulder blades off the floor. 4. Keep your head in line with your body, and do not press your chin to your chest.  5. Hold this position for 1 or 2 seconds, then slowly lower yourself back down to the floor. 6. Repeat 8 to 12 times. Pelvic tilt exercise    1. Lie on your back with your knees bent. 2. \"Brace\" your stomach. This means to tighten your muscles by pulling in and imagining your belly button moving toward your spine. You should feel like your back is pressing to the floor and your hips and pelvis are rocking back. 3. Hold for about 6 seconds while you breathe smoothly. 4. Repeat 8 to 12 times. Heel dig bridging    1. Lie on your back with both knees bent and your ankles bent so that only your heels are digging into the floor. Your knees should be bent about 90 degrees. 2. Then push your heels into the floor, squeeze your buttocks, and lift your hips off the floor until your shoulders, hips, and knees are all in a straight line. 3. Hold for about 6 seconds as you continue to breathe normally, and then slowly lower your hips back down to the floor and rest for up to 10 seconds. 4. Do 8 to 12 repetitions. Hamstring stretch in doorway    1. Lie on your back in a doorway, with one leg through the open door. 2. Slide your leg up the wall to straighten your knee. You should feel a gentle stretch down the back of your leg. 3. Hold the stretch for at least 15 to 30 seconds. Do not arch your back, point your toes, or bend either knee.  Keep one heel touching the floor and the other heel touching the wall. 4. Repeat with your other leg. 5. Do 2 to 4 times for each leg. Hip flexor stretch    1. Kneel on the floor with one knee bent and one leg behind you. Place your forward knee over your foot. Keep your other knee touching the floor. 2. Slowly push your hips forward until you feel a stretch in the upper thigh of your rear leg. 3. Hold the stretch for at least 15 to 30 seconds. Repeat with your other leg. 4. Do 2 to 4 times on each side. Wall sit    1. Stand with your back 10 to 12 inches away from a wall. 2. Lean into the wall until your back is flat against it. 3. Slowly slide down until your knees are slightly bent, pressing your lower back into the wall. 4. Hold for about 6 seconds, then slide back up the wall. 5. Repeat 8 to 12 times. Follow-up care is a key part of your treatment and safety. Be sure to make and go to all appointments, and call your doctor if you are having problems. It's also a good idea to know your test results and keep alist of the medicines you take. Where can you learn more? Go to https://TouristEye.The Wet Seal. org and sign in to your CADsurf account. Enter G251 in the [facility] box to learn more about \"Low Back Pain: Exercises. \"     If you do not have an account, please click on the \"Sign Up Now\" link. Current as of: July 1, 2021               Content Version: 13.2  © 2006-2022 Healthwise, Incorporated. Care instructions adapted under license by [facility] ([facility]). If you have questions about a medical condition or this instruction, always ask your healthcare professional. [place] [address] any warranty or liability for your use of this information. Patient Education        Learning About Low Back Pain  What is low back pain? Low back pain is pain that can occur anywhere below the ribs and above thelegs. It is very common. Almost everyone has it at one time or another.   Low back pain can be:   Acute. This is new pain that can last a few days to a few weeks--at the most a few months.  Chronic. This pain can last for more than a few months. Sometimes it can last for years. What are some myths about low back pain? Here are some common myths about low back pain--and the facts:  Myth: \"I need to rest my back when I have back pain. \"   Fact: Staying active won't hurt you. It may help you get better faster. Myth: \"I need prescription pain medicine. \"   Fact: It's best to try to let time and being active heal your back. Opioid pain medicines--such as hydrocodone or oxycodone--usually don't work any better than over-the-counter medicines like ibuprofen or naproxen. And opioids can cause serious problems like opioid use disorder or overdose. Moderate to severeopioid use disorder is sometimes called addiction. Myth: \"I need a test like an X-ray or an MRI to diagnose my low back pain. \"   Fact: Getting a test right away won't help you get better faster. And it could lead you down a treatment path you may not need, since most people get better ontheir own. What causes low back pain? In most cases, there isn't a clear cause. This can be frustrating, because yourback hurts and there's no obvious reason. Your back pain can be caused by:  Overuse or muscle strain. This can happen from playing sports, lifting heavy things, or not beingphysically fit. A herniated disc. This is a problem with the cushion between the bones in your back. Arthritis. With age, you may have changes in your bones that can narrow the space aroundyour nerves. Other causes. In rare cases, the cause is a serious illness like an infection or cancer. [place] are usually other symptoms too. What are the symptoms? Back pain can come on quickly or over time. You may feel:   Pain in your hips or buttock.  Leg pain, numbness, tingling, or weakness.  When a nerve gets squeezed--such as from a disc problem or arthritis--you may have symptoms in your leg or foot. You can even have leg symptoms from a back problem without having any pain in your back.  Pain that's sharp or dull, sometimes with stiffness or muscle spasms. It may be in one small area or over a broad area. But even bad pain doesn't mean that it's caused by something serious. How is low back pain diagnosed? A physical exam is the main way to diagnose low back pain. Your doctor may examine your back, check your nerves by testing your reflexes, and make sure that your muscles are strong. Your doctor also will ask questions about yourback and overall health. Most people don't need any tests right away. Tests often don't show the reason for your pain. If your pain lasts more than 6 weeks or you have symptoms that your doctor is more concerned about, then your doctor may order tests. These may include an X-ray, a CT scan, or an MRI. Sometimes other tests such as a bone scan or nerveconduction test may be done. How is low back pain treated? Most acute low back pain gets better on its own within a few weeks, no matter what the cause. Time and doing usual activities are all that most people needto feel better. Using heat or ice and taking over-the-counter pain medicine also can help whileyour body heals. If you aren't getting better on your own or your pain is very bad, your doctormay recommend:   Physical therapy.  Spinal manipulation, such as by a chiropractor.  Acupuncture.  Massage.  Injections of steroid medicine in your back (especially for pain that involves your legs). If you have chronic low back pain, treatment will help you understand andmanage your pain. Treatment may include:   Staying active. This may include walking or doing back exercises.  Physical therapy.  Medicines. Some of these medicines are also used for other problems, like depression.  Pain management. Your doctor may have you see a pain specialist.   Counseling.  Having chronic pain can be hard. It may help to talk to someone who can help you cope with your pain. Surgery isn't needed for most people. But it may help some types of low backpain. Follow-up care is a key part of your treatment and safety. Be sure to make and go to all appointments, and call your doctor if you are having problems. It's also a good idea to know your test results and keep alist of the medicines you take. When should you call for help? Call 911 anytime you think you may need emergency care. For example, call if:   You can't move a leg at all. Call your doctor now or seek immediate medical care if:   You have new or worse symptoms in your legs, belly, or buttocks. Symptoms may include:  ? Numbness or tingling. ? Weakness. ? Pain.  You lose bladder or bowel control. Watch closely for changes in your health, and be sure to contact your doctor if:   Along with the back pain, you have a fever, lose weight, or don't feel well.  You do not get better as expected. Where can you learn more? Go to https://OnAir3G.InvestingNote. org and sign in to your Gewara account. Enter A007 in the Haoqiao.cn box to learn more about \"Learning About Low Back Pain. \"     If you do not have an account, please click on the \"Sign Up Now\" link. Current as of: July 1, 2021               Content Version: 13.2  © 0644-7005 Rarelook. Care instructions adapted under license by [facility] ([facility]). If you have questions about a medical condition or this instruction, always ask your healthcare professional. [place] [address] any warranty or liability for your use of this information. Patient Education        Acute Low Back Pain: Exercises  Introduction  Here are some examples of typical rehabilitation exercises for your condition. Start each exercise slowly. Ease off the exercise if you start to have pain.   Your doctor or physical therapist will tell you when you can start theseexercises and which ones will work best for you. When you are not being active, find a comfortable position for rest. Some people are comfortable on the floor or a medium-firm bed with a small pillow under their head and another under their knees. Some people prefer to lie on their side with a pillow between their knees. Don't stay in one position fortoo long. Take short walks (10 to 20 minutes) every 2 to 3 hours. Avoid slopes, hills, and stairs until you feel better. Walk only distances you can manage withoutpain, especially leg pain. How to do the exercises  Back stretches    1. Get down on your hands and knees on the floor. 2. Relax your head and allow it to droop. Round your back up toward the ceiling until you feel a nice stretch in your upper, middle, and lower back. Hold this stretch for as long as it feels comfortable, or about 15 to 30 seconds. 3. Return to the starting position with a flat back while you are on your hands and knees. 4. Let your back sway by pressing your stomach toward the floor. Lift your buttocks toward the ceiling. 5. Hold this position for 15 to 30 seconds. 6. Repeat 2 to 4 times. Follow-up care is a key part of your treatment and safety. Be sure to make and go to all appointments, and call your doctor if you are having problems. It's also a good idea to know your test results and keep alist of the medicines you take. Where can you learn more? Go to https://CO2Nexus.MUBI. org and sign in to your Ufora account. Enter R165 in the Studio Moderna box to learn more about \"Acute Low Back Pain: Exercises. \"     If you do not have an account, please click on the \"Sign Up Now\" link. Current as of: September 8, 2021               Content Version: 13.2  © 2006-2022 Healthwise, Incorporated. Care instructions adapted under license by [facility] ([facility]).  If you have questions about a medical condition or this instruction, always ask your healthcare professional. Jackson Rodriguez, Incorporated disclaims any warranty or liability for your use of this information. Patient Education        Sciatica: Care Instructions  Your Care Instructions     Sciatica (say \"moi-ZR-gw-kuh\") is an irritation of one of the sciatic nerves, which come from the spinal cord in the lower back. The sciatic nerves and their branches extend down through the buttock to the foot. Sciatica can develop when an injured disc in the back irritates or presses against a spinal nerve root. Its main symptom is pain, numbness, or weakness that is often worse in the legor foot than in the back. Sciatica often will improve and go away with time. Early treatment usuallyincludes medicines and exercises to relieve pain. Follow-up care is a key part of your treatment and safety. Be sure to make and go to all appointments, and call your doctor if you are having problems. It's also a good idea to know your test results and keep alist of the medicines you take. How can you care for yourself at home?  Take pain medicines exactly as directed. ? If the doctor gave you a prescription medicine for pain, take it as prescribed. ? If you are not taking a prescription pain medicine, ask your doctor if you can take an over-the-counter medicine.  Use heat or ice to relieve pain. ? To apply heat, put a warm water bottle, heating pad set on low, or warm cloth on your back. Do not go to sleep with a heating pad on your skin. ? To use ice, put ice or a cold pack on the area for 10 to 20 minutes at a time. Put a thin cloth between the ice and your skin.  Avoid sitting if possible, unless it feels better than standing.  Alternate lying down with short walks. Increase your walking distance as you are able to without making your symptoms worse.  Do not do anything that makes your symptoms worse. When should you call for help? Call 911 anytime you think you may need emergency care.  For example, call if:     You are unable to move a leg at all. Call your doctor now or seek immediate medical care if:     You have new or worse symptoms in your legs or buttocks. Symptoms may include:  ? Numbness or tingling. ? Weakness. ? Pain.      You lose bladder or bowel control. Watch closely for changes in your health, and be sure to contact your doctor if:     You are not getting better as expected. Where can you learn more? Go to https://EZDOCTORpepiceweb.Mendocino Software. org and sign in to your Rohati Systems account. Enter [phone] in the [facility] box to learn more about \"Sciatica: Care Instructions. \"     If you do not have an account, please click on the \"Sign Up Now\" link. Current as of: July 1, 2021               Content Version: 13.2  © 2006-2022 Healthwise, Incorporated. Care instructions adapted under license by [facility] [address] If you have questions about a medical condition or this instruction, always ask your healthcare professional. [place] [address] any warranty or liability for your use of this information.

## 2022-06-27 ENCOUNTER — HOSPITAL ENCOUNTER (EMERGENCY)
Age: 42
Discharge: HOME | End: 2022-06-27
Attending: EMERGENCY MEDICINE
Payer: COMMERCIAL

## 2022-06-27 ENCOUNTER — APPOINTMENT (OUTPATIENT)
Dept: GENERAL RADIOLOGY | Age: 42
End: 2022-06-27
Payer: COMMERCIAL

## 2022-06-27 VITALS
TEMPERATURE: 98.5 F | WEIGHT: 160 LBS | OXYGEN SATURATION: 99 % | HEIGHT: 67 IN | HEART RATE: 90 BPM | SYSTOLIC BLOOD PRESSURE: 114 MMHG | DIASTOLIC BLOOD PRESSURE: 85 MMHG | RESPIRATION RATE: 20 BRPM | BODY MASS INDEX: 25.11 KG/M2

## 2022-06-27 DIAGNOSIS — M25.552 LEFT HIP PAIN: Primary | ICD-10-CM

## 2022-06-27 PROCEDURE — 6360000002 HC RX W HCPCS: Performed by: EMERGENCY MEDICINE

## 2022-06-27 PROCEDURE — 96372 THER/PROPH/DIAG INJ SC/IM: CPT

## 2022-06-27 PROCEDURE — 6370000000 HC RX 637 (ALT 250 FOR IP): Performed by: EMERGENCY MEDICINE

## 2022-06-27 PROCEDURE — 73502 X-RAY EXAM HIP UNI 2-3 VIEWS: CPT

## 2022-06-27 PROCEDURE — 99284 EMERGENCY DEPT VISIT MOD MDM: CPT

## 2022-06-27 RX ORDER — NAPROXEN 500 MG/1
500 TABLET ORAL 2 TIMES DAILY PRN
Qty: 20 TABLET | Refills: 0 | Status: SHIPPED | OUTPATIENT
Start: 2022-06-27 | End: 2022-07-07

## 2022-06-27 RX ORDER — LIDOCAINE 4 G/G
1 PATCH TOPICAL ONCE
Status: DISCONTINUED | OUTPATIENT
Start: 2022-06-27 | End: 2022-06-27 | Stop reason: HOSPADM

## 2022-06-27 RX ORDER — HYDROCODONE BITARTRATE AND ACETAMINOPHEN 5; 325 MG/1; MG/1
1 TABLET ORAL ONCE
Status: COMPLETED | OUTPATIENT
Start: 2022-06-27 | End: 2022-06-27

## 2022-06-27 RX ORDER — TRAMADOL HYDROCHLORIDE 50 MG/1
50 TABLET ORAL EVERY 6 HOURS PRN
Qty: 8 TABLET | Refills: 0 | Status: SHIPPED | OUTPATIENT
Start: 2022-06-27 | End: 2022-06-30

## 2022-06-27 RX ORDER — KETOROLAC TROMETHAMINE 30 MG/ML
60 INJECTION, SOLUTION INTRAMUSCULAR; INTRAVENOUS ONCE
Status: COMPLETED | OUTPATIENT
Start: 2022-06-27 | End: 2022-06-27

## 2022-06-27 RX ORDER — ORPHENADRINE CITRATE 30 MG/ML
60 INJECTION INTRAMUSCULAR; INTRAVENOUS ONCE
Status: COMPLETED | OUTPATIENT
Start: 2022-06-27 | End: 2022-06-27

## 2022-06-27 RX ORDER — LIDOCAINE 50 MG/G
1 PATCH TOPICAL EVERY 24 HOURS
Qty: 30 PATCH | Refills: 0 | Status: SHIPPED | OUTPATIENT
Start: 2022-06-27 | End: 2022-07-27

## 2022-06-27 RX ORDER — METHOCARBAMOL 500 MG/1
500 TABLET, FILM COATED ORAL 4 TIMES DAILY PRN
Qty: 20 TABLET | Refills: 0 | Status: SHIPPED | OUTPATIENT
Start: 2022-06-27 | End: 2022-07-02

## 2022-06-27 RX ADMIN — ORPHENADRINE CITRATE 60 MG: 30 INJECTION INTRAMUSCULAR; INTRAVENOUS at 20:50

## 2022-06-27 RX ADMIN — KETOROLAC TROMETHAMINE 60 MG: 30 INJECTION, SOLUTION INTRAMUSCULAR at 20:50

## 2022-06-27 RX ADMIN — HYDROCODONE BITARTRATE AND ACETAMINOPHEN 1 TABLET: 5; 325 TABLET ORAL at 20:49

## 2022-06-27 ASSESSMENT — PAIN DESCRIPTION - PAIN TYPE
TYPE: ACUTE PAIN
TYPE: ACUTE PAIN

## 2022-06-27 ASSESSMENT — PAIN DESCRIPTION - FREQUENCY
FREQUENCY: CONTINUOUS
FREQUENCY: CONTINUOUS

## 2022-06-27 ASSESSMENT — PAIN DESCRIPTION - DESCRIPTORS: DESCRIPTORS: SHARP;SPASM

## 2022-06-27 ASSESSMENT — PAIN DESCRIPTION - ORIENTATION
ORIENTATION: LEFT
ORIENTATION: LEFT;LOWER
ORIENTATION: RIGHT;LEFT

## 2022-06-27 ASSESSMENT — PAIN SCALES - GENERAL
PAINLEVEL_OUTOF10: 5
PAINLEVEL_OUTOF10: 6

## 2022-06-27 ASSESSMENT — PAIN DESCRIPTION - LOCATION
LOCATION: HIP
LOCATION: BACK;HIP;LEG
LOCATION: BACK;LEG;HIP

## 2022-06-27 ASSESSMENT — PAIN - FUNCTIONAL ASSESSMENT: PAIN_FUNCTIONAL_ASSESSMENT: 0-10

## 2022-06-28 NOTE — ED PROVIDER NOTES
eMERGENCY dEPARTMENT eNCOUnter      [address] [facility]    Chief Complaint   Patient presents with    Hip Pain     Pt c/o bilat hip pain, lower back and down Left leg onset yesterday, denies injury. HPI    Delores Gómez is a 42 y.o. female with hx of fibromyalgia , fainting, scoliosis , vertigo, asthma , allergic reaction  who presentsto ED from home   By private car   With complaint of Left hip pain   Onset 2 days   Intensity of symptoms moderate   She denies injury , pain is worse with ambulation   She denies low back pain, Patient denies weakness or numbness in lower extremity , denies bowel bladder incontinence. She used Motrin and a cane to help relieve pain   She denies UTI symptoms, Pregnancy. PAST MEDICAL HISTORY    Past Medical History:   Diagnosis Date    Allergic rhinitis     Asthma     Chronic ear infection     Depressed     Double, Major and Dystemic    Fainting     since age 14    Fibromyalgia     Headache     Scoliosis     Vertigo     Vision loss     poor perpherial vision, doesn't drive       SURGICAL HISTORY    Past Surgical History:   Procedure Laterality Date    COLONOSCOPY N/A 10/21/2021    COLONOSCOPY WITH POLYPECTOMY performed by Maria De Jesus Juares MD at [address] [place] [address]    Current Outpatient Rx   Medication Sig Dispense Refill    lidocaine (LIDODERM) 5 % Place 1 patch onto the skin every 24 hours Place 1 patch onto the skin daily 12 hours on, 12 hours off. 30 patch 0    methocarbamol (ROBAXIN) 500 MG tablet Take 1 tablet by mouth 4 times daily as needed (Muscle spasms) 20 tablet 0    naproxen (NAPROSYN) 500 MG tablet Take 1 tablet by mouth 2 times daily as needed for Pain (with meals) 20 tablet 0    traMADol (ULTRAM) 50 MG tablet Take 1 tablet by mouth every 6 hours as needed for Pain for up to 3 days. Intended supply: 3 days.  Take lowest dose possible to manage pain 8 tablet 0    baclofen (LIORESAL) 10 MG tablet Take 1 tablet by mouth 3 times daily 30 tablet 0    prazosin (MINIPRESS) 1 MG capsule TAKE 1 CAPSULE BY MOUTH AT BEDTIME      traZODone (DESYREL) 100 MG tablet TAKE 1 TABLET BY MOUTH AT BEDTIME AS NEEDED FOR SLEEP      TRINTELLIX 10 MG TABS tablet TAKE 1 TABLET BY MOUTH EVERY DAY      gabapentin (NEURONTIN) 100 MG capsule Take 1 capsule by mouth 2 times daily for 90 days.  Intended supply: 90 days 180 capsule 3    albuterol sulfate HFA (VENTOLIN HFA) 108 (90 Base) MCG/ACT inhaler Inhale 2 puffs into the lungs every 6 hours as needed for Wheezing or Shortness of Breath 2 each 3    albuterol (PROVENTIL) (2.5 MG/3ML) 0.083% nebulizer solution Take 3 mLs by nebulization every 6 hours as needed for Wheezing 120 each 3    Multiple Vitamins-Minerals (MULTIVITAMIN ADULTS PO) Take by mouth daily      vitamin D (CHOLECALCIFEROL) 1000 UNIT TABS tablet Take 1,000 Units by mouth daily         ALLERGIES    Allergies   Allergen Reactions    Latex Hives    Bismuth Subsalicylate Hives    Antibacterial Hand Soap [Triclosan] Hives    Kiwi Extract Hives    Strawberry Extract Hives    Viibryd [Vilazodone Hcl] Hives    Effexor [Venlafaxine] Rash       FAMILY HISTORY    Family History   Problem Relation Age of Onset    Diabetes Mother 15    Arthritis Mother     Kidney Disease Mother         stage 3    Heart Disease Mother         CAD    Other Mother         Neurophathy, eye problems    Clotting Disorder Mother         Brain    Asthma Father     Diabetes Sister         due to mom DM    Alcohol Abuse Neg Hx     Allergy (Severe) Neg Hx     Anemia Neg Hx     Arrhythmia Neg Hx     Atrial Fibrillation Neg Hx     Birth Defects Neg Hx     Breast Cancer Neg Hx     Cancer Neg Hx     Coronary Art Dis Neg Hx     Colon Cancer Neg Hx     Depression Neg Hx     Early Death Neg Hx     Heart Attack Neg Hx     Hearing Loss Neg Hx     High Blood Pressure Neg Hx     High Cholesterol Neg Hx     Learning Disabilities Neg Hx     Mental Illness Neg Hx     Mental Retardation Neg Hx     Miscarriages / Stillbirths Neg Hx     Obesity Neg Hx     Osteoporosis Neg Hx     Prostate Cancer Neg Hx     Stroke Neg Hx     Substance Abuse Neg Hx     Vision Loss Neg Hx        SOCIAL HISTORY    Social History     Socioeconomic History    Marital status:      Spouse name: None    Number of children: None    Years of education: None    Highest education level: None   Occupational History    None   Tobacco Use    Smoking status: Never Smoker    Smokeless tobacco: Never Used   Vaping Use    Vaping Use: Never used   Substance and Sexual Activity    Alcohol use: Yes     Comment: rarely    Drug use: No    Sexual activity: None   Other Topics Concern    None   Social History Narrative    None     Social Determinants of Health     Financial Resource Strain: High Risk    Difficulty of Paying Living Expenses: Hard   Food Insecurity: Food Insecurity Present    Worried About Running Out of Food in the Last Year: Often true    Randall of Food in the Last Year: Often true   Transportation Needs:     Lack of Transportation (Medical): Not on file    Lack of Transportation (Non-Medical):  Not on file   Physical Activity:     Days of Exercise per Week: Not on file    Minutes of Exercise per Session: Not on file   Stress:     Feeling of Stress : Not on file   Social Connections:     Frequency of Communication with Friends and Family: Not on file    Frequency of Social Gatherings with Friends and Family: Not on file    Attends Yazidism Services: Not on file    Active Member of Clubs or Organizations: Not on file    Attends Club or Organization Meetings: Not on file    Marital Status: Not on file   Intimate Partner Violence:     Fear of Current or Ex-Partner: Not on file    Emotionally Abused: Not on file    Physically Abused: Not on file    Sexually Abused: Not on file   Housing Stability:     Unable to Pay for Housing in the Last Year: Not on file    Number of Places Lived in the Last Year: Not on file    Unstable Housing in the Last Year: Not on file       REVIEW OF SYSTEMS    Constitutional:  Denies fever, chills, weight loss or weakness   Eyes:  Denies photophobia or discharge   HENT:  Denies sore throat or ear pain   Respiratory:  Denies cough or shortness of breath   Cardiovascular:  Denies chest pain, palpitations or swelling   GI:  Denies abdominal pain, nausea, vomiting, or diarrhea   Musculoskeletal: c/o Left hip pain , Denies back pain   Skin:  Denies rash   Neurologic:  Denies headache, focal weakness or sensory changes   Endocrine:  Denies polyuria or polydypsia   Lymphatic:  Denies swollen glands   Psychiatric:  Denies depression, suicidal ideation or homicidal ideation   All systems negative except as marked. PHYSICAL EXAM    VITAL SIGNS: /85   Pulse 90   Temp 98.5 °F (36.9 °C) (Temporal)   Resp 20   Ht 5' 6.75\" (1.695 m)   Wt 160 lb (72.6 kg)   LMP 06/01/2022 (Approximate)   SpO2 99%   BMI 25.25 kg/m²    Constitutional:  Well developed, Well nourished, Mild acute distress, Non-toxic appearance. HENT:  Normocephalic, Atraumatic, Bilateral external ears normal, Oropharynx moist, No oral exudates, Nose normal. Neck- Normal range of motion, No tenderness, Supple, No stridor. Eyes:  PERRL, EOMI, Conjunctiva normal, No discharge. Respiratory:  Normal breath sounds, No respiratory distress, No wheezing, No chest tenderness. Cardiovascular:  Normal heart rate, Normal rhythm, No murmurs, No rubs, No gallops. GI:  Bowel sounds normal, Soft, No tenderness, No masses, No pulsatile masses. :  No CVA tenderness. Musculoskeletal:Left Hip- No deformity , Mild  Lateral tenderness , No Point tenderness, distal NV intact     Intact distal pulses, No edema, No tenderness, No cyanosis, No clubbing. Good range of motion in all major joints. No tenderness to palpation or major deformities noted. Back- No tenderness. Integument:  Warm, Dry, No erythema, No rash. Lymphatic:  No lymphadenopathy noted. Neurologic:  Alert & oriented x 3, Normal motor function, Normal sensory function, No focal deficits noted. Psychiatric:  Affect normal, Judgment normal, Mood normal.       RADIOLOGY    XR HIP 2-3 VW W PELVIS LEFT    (Results Pending)       REEVALUATION   Patient was updated the results of  Radiology. Pain control adeqaute. Summation      Patient Course:     ED Medications administered this visit:    Medications   orphenadrine (NORFLEX) injection 60 mg (60 mg IntraMUSCular Given 6/27/22 2050)   ketorolac (TORADOL) injection 60 mg (60 mg IntraMUSCular Given 6/27/22 2050)   HYDROcodone-acetaminophen (NORCO) 5-325 MG per tablet 1 tablet (1 tablet Oral Given 6/27/22 2049)       New Prescriptions from this visit:    Discharge Medication List as of 6/27/2022  9:04 PM      START taking these medications    Details   lidocaine (LIDODERM) 5 % Place 1 patch onto the skin every 24 hours Place 1 patch onto the skin daily 12 hours on, 12 hours off., Disp-30 patch, R-0Print      methocarbamol (ROBAXIN) 500 MG tablet Take 1 tablet by mouth 4 times daily as needed (Muscle spasms), Disp-20 tablet, R-0Print      naproxen (NAPROSYN) 500 MG tablet Take 1 tablet by mouth 2 times daily as needed for Pain (with meals), Disp-20 tablet, R-0Print      traMADol (ULTRAM) 50 MG tablet Take 1 tablet by mouth every 6 hours as needed for Pain for up to 3 days. Intended supply: 3 days. Take lowest dose possible to manage pain, Disp-8 tablet, R-0Print             Follow-up:  Brianda Torre MD  [address] [place] [address]  [address]  [phone]    Schedule an appointment as soon as possible for a visit in 3 days  Follow up within 3 days, Return to ED sooner if symptoms worsen        Final Impression:   1.  Left hip pain               (Please note that portions of this note were completed with a voice recognition program.  Efforts were made to edit the dictations but occasionally words are mis-transcribed.)          Janel Boyd MD  06/28/22 [id number]

## 2022-06-30 ENCOUNTER — OFFICE VISIT (OUTPATIENT)
Dept: PRIMARY CARE CLINIC | Age: 42
End: 2022-06-30
Payer: COMMERCIAL

## 2022-06-30 VITALS
HEIGHT: 66 IN | HEART RATE: 100 BPM | WEIGHT: 164 LBS | OXYGEN SATURATION: 98 % | BODY MASS INDEX: 26.36 KG/M2 | DIASTOLIC BLOOD PRESSURE: 78 MMHG | TEMPERATURE: 97.5 F | SYSTOLIC BLOOD PRESSURE: 122 MMHG

## 2022-06-30 DIAGNOSIS — S76.012D HIP STRAIN, LEFT, SUBSEQUENT ENCOUNTER: Primary | ICD-10-CM

## 2022-06-30 PROCEDURE — G8427 DOCREV CUR MEDS BY ELIG CLIN: HCPCS | Performed by: INTERNAL MEDICINE

## 2022-06-30 PROCEDURE — 1036F TOBACCO NON-USER: CPT | Performed by: INTERNAL MEDICINE

## 2022-06-30 PROCEDURE — 99213 OFFICE O/P EST LOW 20 MIN: CPT | Performed by: INTERNAL MEDICINE

## 2022-06-30 PROCEDURE — G8419 CALC BMI OUT NRM PARAM NOF/U: HCPCS | Performed by: INTERNAL MEDICINE

## 2022-06-30 RX ORDER — GABAPENTIN 300 MG/1
CAPSULE ORAL
COMMUNITY
Start: 2022-06-11

## 2022-06-30 ASSESSMENT — PATIENT HEALTH QUESTIONNAIRE - PHQ9
5. POOR APPETITE OR OVEREATING: 0
9. THOUGHTS THAT YOU WOULD BE BETTER OFF DEAD, OR OF HURTING YOURSELF: 0
3. TROUBLE FALLING OR STAYING ASLEEP: 0
6. FEELING BAD ABOUT YOURSELF - OR THAT YOU ARE A FAILURE OR HAVE LET YOURSELF OR YOUR FAMILY DOWN: 0
SUM OF ALL RESPONSES TO PHQ QUESTIONS 1-9: 0
8. MOVING OR SPEAKING SO SLOWLY THAT OTHER PEOPLE COULD HAVE NOTICED. OR THE OPPOSITE, BEING SO FIGETY OR RESTLESS THAT YOU HAVE BEEN MOVING AROUND A LOT MORE THAN USUAL: 0
SUM OF ALL RESPONSES TO PHQ QUESTIONS 1-9: 0
10. IF YOU CHECKED OFF ANY PROBLEMS, HOW DIFFICULT HAVE THESE PROBLEMS MADE IT FOR YOU TO DO YOUR WORK, TAKE CARE OF THINGS AT HOME, OR GET ALONG WITH OTHER PEOPLE: 0
2. FEELING DOWN, DEPRESSED OR HOPELESS: 0
SUM OF ALL RESPONSES TO PHQ QUESTIONS 1-9: 0
SUM OF ALL RESPONSES TO PHQ9 QUESTIONS 1 & 2: 0
4. FEELING TIRED OR HAVING LITTLE ENERGY: 0
SUM OF ALL RESPONSES TO PHQ QUESTIONS 1-9: 0
7. TROUBLE CONCENTRATING ON THINGS, SUCH AS READING THE NEWSPAPER OR WATCHING TELEVISION: 0
1. LITTLE INTEREST OR PLEASURE IN DOING THINGS: 0

## 2022-06-30 ASSESSMENT — ENCOUNTER SYMPTOMS
SHORTNESS OF BREATH: 0
VOMITING: 0
ABDOMINAL PAIN: 0
SINUS PAIN: 0
DIARRHEA: 0
SINUS PRESSURE: 0
COUGH: 0
NAUSEA: 0
WHEEZING: 0
RHINORRHEA: 0
SORE THROAT: 0

## 2022-06-30 NOTE — PROGRESS NOTES
Subjective:      Patient ID: Jailene Doss is a 42 y.o. female      Hip pain f/u  HPI  Pt presents for follow up regarding left hip pain. Seen in the ER. XR is negative. Pain is gradually improving on methocarbamol, lidocaine, tramadol and naproxen. Past Medical History:   Diagnosis Date    Allergic rhinitis     Asthma     Chronic ear infection     Depressed     Double, Major and Dystemic    Fainting     since age 14    Fibromyalgia     Headache     Scoliosis     Vertigo     Vision loss     poor perpherial vision, doesn't drive     Past Surgical History:   Procedure Laterality Date    COLONOSCOPY N/A 10/21/2021    COLONOSCOPY WITH POLYPECTOMY performed by Rosy Garcia MD at [facility] History     Socioeconomic History    Marital status:      Spouse name: Not on file    Number of children: Not on file    Years of education: Not on file    Highest education level: Not on file   Occupational History    Not on file   Tobacco Use    Smoking status: Never Smoker    Smokeless tobacco: Never Used   Vaping Use    Vaping Use: Never used   Substance and Sexual Activity    Alcohol use: Yes     Comment: rarely    Drug use: No    Sexual activity: Not on file   Other Topics Concern    Not on file   Social History Narrative    Not on file     Social Determinants of Health     Financial Resource Strain: High Risk    Difficulty of Paying Living Expenses: Hard   Food Insecurity: Food Insecurity Present    Worried About Running Out of Food in the Last Year: Often true    Randall of Food in the Last Year: Often true   Transportation Needs:     Lack of Transportation (Medical): Not on file    Lack of Transportation (Non-Medical):  Not on file   Physical Activity:     Days of Exercise per Week: Not on file    Minutes of Exercise per Session: Not on file   Stress:     Feeling of Stress : Not on file   Social Connections:     Frequency of Communication with Friends and Family: Not on file    Frequency of Social Gatherings with Friends and Family: Not on file    Attends Taoist Services: Not on file    Active Member of Clubs or Organizations: Not on file    Attends Club or Organization Meetings: Not on file    Marital Status: Not on file   Intimate Partner Violence:     Fear of Current or Ex-Partner: Not on file    Emotionally Abused: Not on file    Physically Abused: Not on file    Sexually Abused: Not on file   Housing Stability:     Unable to Pay for Housing in the Last Year: Not on file    Number of Places Lived in the Last Year: Not on file    Unstable Housing in the Last Year: Not on file     Family History   Problem Relation Age of Onset    Diabetes Mother 15   Connye Sole Arthritis Mother     Kidney Disease Mother         stage 3    Heart Disease Mother         CAD    Other Mother         Neurophathy, eye problems    Clotting Disorder Mother         Brain    Asthma Father     Diabetes Sister         due to mom DM    Alcohol Abuse Neg Hx     Allergy (Severe) Neg Hx     Anemia Neg Hx     Arrhythmia Neg Hx     Atrial Fibrillation Neg Hx     Birth Defects Neg Hx     Breast Cancer Neg Hx     Cancer Neg Hx     Coronary Art Dis Neg Hx     Colon Cancer Neg Hx     Depression Neg Hx     Early Death Neg Hx     Heart Attack Neg Hx     Hearing Loss Neg Hx     High Blood Pressure Neg Hx     High Cholesterol Neg Hx     Learning Disabilities Neg Hx     Mental Illness Neg Hx     Mental Retardation Neg Hx     Miscarriages / Stillbirths Neg Hx     Obesity Neg Hx     Osteoporosis Neg Hx     Prostate Cancer Neg Hx     Stroke Neg Hx     Substance Abuse Neg Hx     Vision Loss Neg Hx      Allergies:  Latex, Bismuth subsalicylate, Antibacterial hand soap [triclosan], Kiwi extract, Strawberry extract, Viibryd [vilazodone hcl], and Effexor [venlafaxine]  Patient Active Problem List   Diagnosis    Asthma    Allergic rhinitis    Chronic ear infection  Depressed    Fainting    Headache    Scoliosis    Vertigo    Vision loss    Polyp of colon     Current Outpatient Medications on File Prior to Visit   Medication Sig Dispense Refill    gabapentin (NEURONTIN) 300 MG capsule TAKE 1 CAPSULE BY MOUTH AT BEDTIME      lidocaine (LIDODERM) 5 % Place 1 patch onto the skin every 24 hours Place 1 patch onto the skin daily 12 hours on, 12 hours off. 30 patch 0    methocarbamol (ROBAXIN) 500 MG tablet Take 1 tablet by mouth 4 times daily as needed (Muscle spasms) 20 tablet 0    naproxen (NAPROSYN) 500 MG tablet Take 1 tablet by mouth 2 times daily as needed for Pain (with meals) 20 tablet 0    traMADol (ULTRAM) 50 MG tablet Take 1 tablet by mouth every 6 hours as needed for Pain for up to 3 days. Intended supply: 3 days. Take lowest dose possible to manage pain 8 tablet 0    baclofen (LIORESAL) 10 MG tablet Take 1 tablet by mouth 3 times daily 30 tablet 0    prazosin (MINIPRESS) 1 MG capsule TAKE 1 CAPSULE BY MOUTH AT BEDTIME      traZODone (DESYREL) 100 MG tablet TAKE 1 TABLET BY MOUTH AT BEDTIME AS NEEDED FOR SLEEP      TRINTELLIX 10 MG TABS tablet TAKE 1 TABLET BY MOUTH EVERY DAY      gabapentin (NEURONTIN) 100 MG capsule Take 1 capsule by mouth 2 times daily for 90 days. Intended supply: 90 days 180 capsule 3    albuterol sulfate HFA (VENTOLIN HFA) 108 (90 Base) MCG/ACT inhaler Inhale 2 puffs into the lungs every 6 hours as needed for Wheezing or Shortness of Breath 2 each 3    albuterol (PROVENTIL) (2.5 MG/3ML) 0.083% nebulizer solution Take 3 mLs by nebulization every 6 hours as needed for Wheezing 120 each 3    Multiple Vitamins-Minerals (MULTIVITAMIN ADULTS PO) Take by mouth daily      vitamin D (CHOLECALCIFEROL) 1000 UNIT TABS tablet Take 1,000 Units by mouth daily       No current facility-administered medications on file prior to visit. Review of Systems   Constitutional: Negative for chills, diaphoresis, fatigue and fever.    HENT: Negative for congestion, ear discharge, ear pain, rhinorrhea, sinus pressure, sinus pain, sneezing and sore throat. Respiratory: Negative for cough, shortness of breath and wheezing. Cardiovascular: Negative for chest pain. Gastrointestinal: Negative for abdominal pain, diarrhea, nausea and vomiting. Endocrine: Negative for cold intolerance and heat intolerance. Genitourinary: Negative for dysuria and frequency. Musculoskeletal: Positive for arthralgias. Neurological: Negative for dizziness and light-headedness. Objective:   /78 (Site: Left Upper Arm, Cuff Size: Small Adult)   Pulse 100   Temp 97.5 °F (36.4 °C) (Temporal)   Ht 5' 6\" (1.676 m)   Wt 164 lb (74.4 kg)   LMP 06/01/2022   SpO2 98%   BMI 26.47 kg/m²     Physical Exam  Constitutional:       General: She is not in acute distress. Appearance: She is not diaphoretic. Cardiovascular:      Rate and Rhythm: Normal rate and regular rhythm. Pulses: Normal pulses. Heart sounds: Normal heart sounds, S1 normal and S2 normal.   Pulmonary:      Effort: Pulmonary effort is normal. No respiratory distress. Breath sounds: Normal breath sounds. No wheezing or rales. Chest:      Chest wall: No tenderness. Abdominal:      General: Bowel sounds are normal.      Tenderness: There is no abdominal tenderness. Musculoskeletal:      Comments: Left lateral hip TTP   Neurological:      Mental Status: She is alert. Assessment:       Diagnosis Orders   1. Hip strain, left, subsequent encounter       Plan:      No orders of the defined types were placed in this encounter. Return if symptoms worsen or fail to improve.

## 2022-09-01 ENCOUNTER — HOSPITAL ENCOUNTER (EMERGENCY)
Age: 42
Discharge: HOME | End: 2022-09-01
Payer: COMMERCIAL

## 2022-09-01 ENCOUNTER — APPOINTMENT (OUTPATIENT)
Dept: GENERAL RADIOLOGY | Age: 42
End: 2022-09-01
Payer: COMMERCIAL

## 2022-09-01 VITALS
SYSTOLIC BLOOD PRESSURE: 125 MMHG | HEIGHT: 67 IN | TEMPERATURE: 97.7 F | HEART RATE: 62 BPM | WEIGHT: 168 LBS | BODY MASS INDEX: 26.37 KG/M2 | RESPIRATION RATE: 16 BRPM | OXYGEN SATURATION: 99 % | DIASTOLIC BLOOD PRESSURE: 66 MMHG

## 2022-09-01 DIAGNOSIS — S20.212A RIB CONTUSION, LEFT, INITIAL ENCOUNTER: Primary | ICD-10-CM

## 2022-09-01 PROCEDURE — 71101 X-RAY EXAM UNILAT RIBS/CHEST: CPT

## 2022-09-01 PROCEDURE — 99283 EMERGENCY DEPT VISIT LOW MDM: CPT

## 2022-09-01 PROCEDURE — 6370000000 HC RX 637 (ALT 250 FOR IP)

## 2022-09-01 RX ORDER — TRAMADOL HYDROCHLORIDE 50 MG/1
50 TABLET ORAL ONCE
Status: COMPLETED | OUTPATIENT
Start: 2022-09-01 | End: 2022-09-01

## 2022-09-01 RX ORDER — HYDROCODONE BITARTRATE AND ACETAMINOPHEN 5; 325 MG/1; MG/1
1 TABLET ORAL EVERY 6 HOURS PRN
Qty: 10 TABLET | Refills: 0 | Status: SHIPPED | OUTPATIENT
Start: 2022-09-01 | End: 2022-09-04

## 2022-09-01 RX ADMIN — TRAMADOL HYDROCHLORIDE 50 MG: 50 TABLET, COATED ORAL at 19:01

## 2022-09-01 ASSESSMENT — ENCOUNTER SYMPTOMS
VOMITING: 0
NAUSEA: 0
COUGH: 0
SHORTNESS OF BREATH: 0
DIARRHEA: 0
BACK PAIN: 0
SORE THROAT: 0
ABDOMINAL PAIN: 0

## 2022-09-01 ASSESSMENT — PAIN SCALES - GENERAL: PAINLEVEL_OUTOF10: 3

## 2022-09-01 NOTE — Clinical Note
Babita Klein was seen and treated in our emergency department on 9/1/2022. She may return to work on 09/04/2022. If you have any questions or concerns, please don't hesitate to call.       Cristo Florez, GREGORY - CNP

## 2022-09-01 NOTE — ED PROVIDER NOTES
[address] ED  eMERGENCYdEPARTMENT eNCOUnter      Pt Name: Deandre Best  MRN: [id number]  [place] 1980of evaluation: 9/1/2022  [address] [place] [address]       Chief Complaint   Patient presents with    Rib Injury     Left lateral ribcage pain. Got dizzy at work and fell onto a box yesterday. HISTORY OF PRESENT ILLNESS  (Location/Symptom, Timing/Onset, Context/Setting, Quality, Duration, Modifying Factors, Severity.)   Deandre Best is a 36 y.o. female who presents to the emergency department with rib cage pain s/p fall. She was working yesterday squatting and lifting things up and down when she fell onto her left rib cage. She complains of rib tenderness to left rib cage and pain 3/10 achy unrelieved with Tylenol. Denies any CP, Sob, fever, chills. HPI    Nursing Notes were reviewed and I agree. REVIEW OF SYSTEMS    (2-9 systems for level 4, 10 or more for level 5)     Review of Systems   Constitutional:  Negative for activity change, chills and fever. HENT:  Negative for ear pain and sore throat. Eyes:  Negative for visual disturbance. Respiratory:  Negative for cough and shortness of breath. Cardiovascular:  Negative for chest pain, palpitations and leg swelling. Gastrointestinal:  Negative for abdominal pain, diarrhea, nausea and vomiting. Genitourinary:  Negative for dysuria. Musculoskeletal:  Positive for myalgias (left rib cage). Negative for back pain. Skin:  Negative for rash. Neurological:  Negative for dizziness and weakness. as noted above the remainder of the review of systems was reviewed and negative.        PAST MEDICAL HISTORY     Past Medical History:   Diagnosis Date    Allergic rhinitis     Asthma     Chronic ear infection     Depressed     Double, Major and Dystemic    Fainting     since age 14    Fibromyalgia     Headache     Scoliosis     Vertigo     Vision loss     poor perpherial vision, doesn't drive SURGICAL HISTORY       Past Surgical History:   Procedure Laterality Date    COLONOSCOPY N/A 10/21/2021    COLONOSCOPY WITH POLYPECTOMY performed by Darleen Arellano MD at [place] [address]       Previous Medications    ALBUTEROL (PROVENTIL) (2.5 MG/3ML) 0.083% NEBULIZER SOLUTION    Take 3 mLs by nebulization every 6 hours as needed for Wheezing    ALBUTEROL SULFATE HFA (VENTOLIN HFA) 108 (90 BASE) MCG/ACT INHALER    Inhale 2 puffs into the lungs every 6 hours as needed for Wheezing or Shortness of Breath    BACLOFEN (LIORESAL) 10 MG TABLET    Take 1 tablet by mouth 3 times daily    GABAPENTIN (NEURONTIN) 100 MG CAPSULE    Take 1 capsule by mouth 2 times daily for 90 days.  Intended supply: 90 days    GABAPENTIN (NEURONTIN) 300 MG CAPSULE    TAKE 1 CAPSULE BY MOUTH AT BEDTIME    MULTIPLE VITAMINS-MINERALS (MULTIVITAMIN ADULTS PO)    Take by mouth daily    NAPROXEN (NAPROSYN) 500 MG TABLET    Take 1 tablet by mouth 2 times daily as needed for Pain (with meals)    PRAZOSIN (MINIPRESS) 1 MG CAPSULE    TAKE 1 CAPSULE BY MOUTH AT BEDTIME    TRAZODONE (DESYREL) 100 MG TABLET    TAKE 1 TABLET BY MOUTH AT BEDTIME AS NEEDED FOR SLEEP    TRINTELLIX 10 MG TABS TABLET    TAKE 1 TABLET BY MOUTH EVERY DAY    VITAMIN D (CHOLECALCIFEROL) 1000 UNIT TABS TABLET    Take 1,000 Units by mouth daily       ALLERGIES     Latex, Bismuth subsalicylate, Antibacterial hand soap [triclosan], Kiwi extract, Strawberry extract, Viibryd [vilazodone hcl], and Effexor [venlafaxine]    HISTORY       Family History   Problem Relation Age of Onset    Diabetes Mother 15    Arthritis Mother     Kidney Disease Mother         stage 3    Heart Disease Mother         CAD    Other Mother         Neurophathy, eye problems    Clotting Disorder Mother         Brain    Asthma Father     Diabetes Sister         due to mom DM    Alcohol Abuse Neg Hx     Allergy (Severe) Neg Hx     Anemia Neg Hx     Arrhythmia Neg Hx Atrial Fibrillation Neg Hx     Birth Defects Neg Hx     Breast Cancer Neg Hx     Cancer Neg Hx     Coronary Art Dis Neg Hx     Colon Cancer Neg Hx     Depression Neg Hx     Early Death Neg Hx     Heart Attack Neg Hx     Hearing Loss Neg Hx     High Blood Pressure Neg Hx     High Cholesterol Neg Hx     Learning Disabilities Neg Hx     Mental Illness Neg Hx     Mental Retardation Neg Hx     Miscarriages / Stillbirths Neg Hx     Obesity Neg Hx     Osteoporosis Neg Hx     Prostate Cancer Neg Hx     Stroke Neg Hx     Substance Abuse Neg Hx     Vision Loss Neg Hx           SOCIAL HISTORY       Social History     Socioeconomic History    Marital status:    Tobacco Use    Smoking status: Never    Smokeless tobacco: Never   Vaping Use    Vaping Use: Never used   Substance and Sexual Activity    Alcohol use: Yes     Comment: rarely    Drug use: No     Social Determinants of Health     Financial Resource Strain: High Risk    Difficulty of Paying Living Expenses: Hard   Food Insecurity: Food Insecurity Present    Worried About Running Out of Food in the Last Year: Often true    Ran Out of Food in the Last Year: Often true       SCREENINGS           PHYSICAL EXAM    (up to 7 forlevel 4, 8 or more for level 5)     ED Triage Vitals [09/01/22 1716]   BP Temp Temp Source Heart Rate Resp SpO2 Height Weight   131/85 97.7 °F (36.5 °C) Temporal 82 20 99 % 5' 6.5\" (1.689 m) 168 lb (76.2 kg)       Physical Exam  Vitals and nursing note reviewed. Constitutional:       Appearance: She is well-developed. HENT:      Head: Normocephalic. Right Ear: External ear normal.      Left Ear: External ear normal.      Nose: No congestion. Mouth/Throat:      Mouth: Mucous membranes are moist.   Eyes:      Conjunctiva/sclera: Conjunctivae normal.      Pupils: Pupils are equal, round, and reactive to light. Cardiovascular:      Rate and Rhythm: Normal rate and regular rhythm. Pulses: Normal pulses.       Heart sounds: Normal heart sounds. No murmur heard. No friction rub. Pulmonary:      Effort: Pulmonary effort is normal.      Breath sounds: Normal breath sounds. No wheezing or rhonchi. Abdominal:      General: Bowel sounds are normal. There is no distension. Palpations: Abdomen is soft. Tenderness: There is no abdominal tenderness. Musculoskeletal:         General: Normal range of motion. Cervical back: Normal range of motion and neck supple. Skin:     General: Skin is warm and dry. Capillary Refill: Capillary refill takes less than 2 seconds. Neurological:      Mental Status: She is alert and oriented to person, place, and time. Psychiatric:         Mood and Affect: Mood normal.         Behavior: Behavior normal.         Thought Content: Thought content normal.         DIAGNOSTIC RESULTS     RADIOLOGY:   Non-plain film images such as CT, Ultrasound and MRI are read by the radiologist. Plain radiographic images are visualized and preliminarilyinterpreted by GREGORY Bingham CNP with the below findings:        Interpretation per the Radiologist below, if available at the time of this note:    XR RIBS LEFT INCLUDE CHEST (MIN 3 VIEWS)   Final Result   Impression:   1. No acute displaced rib fracture is identified. 2. No acute cardiopulmonary disease. LABS:  Labs Reviewed - No data to display    All other labs were within normal range or not returnedas of this dictation. EMERGENCYDEPARTMENT COURSE and DIFFERENTIAL DIAGNOSIS/MDM:   Vitals:    Vitals:    09/01/22 1716   BP: 131/85   Pulse: 82   Resp: 20   Temp: 97.7 °F (36.5 °C)   TempSrc: Temporal   SpO2: 99%   Weight: 168 lb (76.2 kg)   Height: 5' 6.5\" (1.689 m)       REASSESSMENT            MDM  HN 36year old female presents with rib injury s/p fall yesterday. Patient afebrile and hemodynamically stable. Xray chest shows no acute intrathoracic process. Medicated with Tramadol po. Pain improved post medication.  Discussed Dx,Tx,Rx, Follow up care, and reasons to return to ED for further treatment she states understanding and denies any questions. Norco Rx given. Patient to follow up with occupational health, states understanding and denies any questions. PROCEDURES:    Procedures      FINAL IMPRESSION      1. Rib contusion, left, initial encounter Stable         DISPOSITION/PLAN   DISPOSITION Decision To Discharge 09/01/2022 08:00:38 PM      PATIENT REFERRED TO:  [address] [place] [address] -- [place] [id number]   [phone]  Call in 1 day      DISCHARGE MEDICATIONS:  New Prescriptions    HYDROCODONE-ACETAMINOPHEN (NORCO) 5-325 MG PER TABLET    Take 1 tablet by mouth every 6 hours as needed for Pain for up to 3 days. Intended supply: 3 days.  Take lowest dose possible to manage pain       (Please note that portions of this note were completed with a voice recognition program.  Efforts were made to edit the dictations but occasionally words are mis-transcribed.)    GREGORY Mendoza CNP, APRN - CNP  09/01/22 2003

## 2022-09-01 NOTE — ED TRIAGE NOTES
Patient in with a left rib injury that happened after she fell into boxes at work yesterday.  Rates 3

## 2022-10-01 ENCOUNTER — HOSPITAL ENCOUNTER (OUTPATIENT)
Dept: GENERAL RADIOLOGY | Age: 42
Discharge: HOME | End: 2022-10-03
Payer: COMMERCIAL

## 2022-10-01 ENCOUNTER — OFFICE VISIT (OUTPATIENT)
Dept: FAMILY MEDICINE CLINIC | Age: 42
End: 2022-10-01
Payer: COMMERCIAL

## 2022-10-01 VITALS
SYSTOLIC BLOOD PRESSURE: 118 MMHG | WEIGHT: 168 LBS | OXYGEN SATURATION: 97 % | HEART RATE: 106 BPM | HEIGHT: 67 IN | BODY MASS INDEX: 26.37 KG/M2 | DIASTOLIC BLOOD PRESSURE: 70 MMHG | TEMPERATURE: 97 F

## 2022-10-01 DIAGNOSIS — M54.40 LOW BACK PAIN WITH SCIATICA, SCIATICA LATERALITY UNSPECIFIED, UNSPECIFIED BACK PAIN LATERALITY, UNSPECIFIED CHRONICITY: ICD-10-CM

## 2022-10-01 DIAGNOSIS — M54.40 LOW BACK PAIN WITH SCIATICA, SCIATICA LATERALITY UNSPECIFIED, UNSPECIFIED BACK PAIN LATERALITY, UNSPECIFIED CHRONICITY: Primary | ICD-10-CM

## 2022-10-01 DIAGNOSIS — M54.30 BACK PAIN WITH SCIATICA: ICD-10-CM

## 2022-10-01 DIAGNOSIS — M54.9 BACK PAIN WITH SCIATICA: ICD-10-CM

## 2022-10-01 PROCEDURE — 1036F TOBACCO NON-USER: CPT | Performed by: NURSE PRACTITIONER

## 2022-10-01 PROCEDURE — G8419 CALC BMI OUT NRM PARAM NOF/U: HCPCS | Performed by: NURSE PRACTITIONER

## 2022-10-01 PROCEDURE — 99213 OFFICE O/P EST LOW 20 MIN: CPT | Performed by: NURSE PRACTITIONER

## 2022-10-01 PROCEDURE — G8427 DOCREV CUR MEDS BY ELIG CLIN: HCPCS | Performed by: NURSE PRACTITIONER

## 2022-10-01 PROCEDURE — 72100 X-RAY EXAM L-S SPINE 2/3 VWS: CPT

## 2022-10-01 PROCEDURE — G8484 FLU IMMUNIZE NO ADMIN: HCPCS | Performed by: NURSE PRACTITIONER

## 2022-10-01 RX ORDER — METHYLPREDNISOLONE 4 MG/1
TABLET ORAL
Qty: 1 KIT | Refills: 0 | Status: SHIPPED | OUTPATIENT
Start: 2022-10-01 | End: 2022-10-14 | Stop reason: ALTCHOICE

## 2022-10-01 SDOH — ECONOMIC STABILITY: FOOD INSECURITY: WITHIN THE PAST 12 MONTHS, THE FOOD YOU BOUGHT JUST DIDN'T LAST AND YOU DIDN'T HAVE MONEY TO GET MORE.: NEVER TRUE

## 2022-10-01 SDOH — ECONOMIC STABILITY: FOOD INSECURITY: WITHIN THE PAST 12 MONTHS, YOU WORRIED THAT YOUR FOOD WOULD RUN OUT BEFORE YOU GOT MONEY TO BUY MORE.: NEVER TRUE

## 2022-10-01 ASSESSMENT — ENCOUNTER SYMPTOMS
PHOTOPHOBIA: 0
VOMITING: 0
SORE THROAT: 0
DIARRHEA: 0
EYE REDNESS: 0
EYE PAIN: 0
WHEEZING: 0
ABDOMINAL PAIN: 0
COUGH: 0
BACK PAIN: 1
SHORTNESS OF BREATH: 0
NAUSEA: 0

## 2022-10-01 ASSESSMENT — SOCIAL DETERMINANTS OF HEALTH (SDOH): HOW HARD IS IT FOR YOU TO PAY FOR THE VERY BASICS LIKE FOOD, HOUSING, MEDICAL CARE, AND HEATING?: NOT HARD AT ALL

## 2022-10-01 NOTE — PROGRESS NOTES
Subjective:      Patient ID: Char Salmeron is a 36 y.o. female who presents today for:  Chief Complaint   Patient presents with    Back Pain     Patient states that her sciatica is acting up, difficulty walking without pain, x1day tx:Baclifan       HPIpt is not sure if she did anything to her back. She states she woke up this am, and it hurt a lot and she has hard time changing positions and getting up from sitting to standing   Pt normally has a lot of sensory changes and is on gabapentin, she states normally she has a lot of pins and needles and burning feeling- when assessing her sensation - pt states she feels as I am running a knife though her skin. She is able to move but it take s her a bit of time. She denies any changes in bowel habits abut states she does have to sit longer on toilet before going.  Based on chart she has had back and left side hip pain problems   I spoke with her that we will add xray at this time   And steroid- but she needs to see her pcp and possibly see pain management for her ongoing problems      Past Medical History:   Diagnosis Date    Allergic rhinitis     Asthma     Chronic ear infection     Depressed     Double, Major and Dystemic    Fainting     since age 14    Fibromyalgia     Headache     Scoliosis     Vertigo     Vision loss     poor perpherial vision, doesn't drive     Past Surgical History:   Procedure Laterality Date    COLONOSCOPY N/A 10/21/2021    COLONOSCOPY WITH POLYPECTOMY performed by Earnestine Garay MD at [address] [place]       Family History   Problem Relation Age of Onset    Diabetes Mother 15    Arthritis Mother     Kidney Disease Mother         stage 3    Heart Disease Mother         CAD    Other Mother         Neurophathy, eye problems    Clotting Disorder Mother         Brain    Asthma Father     Diabetes Sister         due to mom DM    Alcohol Abuse Neg Hx     Allergy (Severe) Neg Hx     Anemia Neg Hx     Arrhythmia Neg Hx     Atrial Fibrillation Neg Hx     Birth Defects Neg Hx     Breast Cancer Neg Hx     Cancer Neg Hx     Coronary Art Dis Neg Hx     Colon Cancer Neg Hx     Depression Neg Hx     Early Death Neg Hx     Heart Attack Neg Hx     Hearing Loss Neg Hx     High Blood Pressure Neg Hx     High Cholesterol Neg Hx     Learning Disabilities Neg Hx     Mental Illness Neg Hx     Mental Retardation Neg Hx     Miscarriages / Stillbirths Neg Hx     Obesity Neg Hx     Osteoporosis Neg Hx     Prostate Cancer Neg Hx     Stroke Neg Hx     Substance Abuse Neg Hx     Vision Loss Neg Hx      Social History     Socioeconomic History    Marital status:      Spouse name: Not on file    Number of children: Not on file    Years of education: Not on file    Highest education level: Not on file   Occupational History    Not on file   Tobacco Use    Smoking status: Never    Smokeless tobacco: Never   Vaping Use    Vaping Use: Never used   Substance and Sexual Activity    Alcohol use: Yes     Comment: rarely    Drug use: No    Sexual activity: Not on file   Other Topics Concern    Not on file   Social History Narrative    Not on file     Social Determinants of Health     Financial Resource Strain: Low Risk     Difficulty of Paying Living Expenses: Not hard at all   Food Insecurity: No Food Insecurity    Worried About Running Out of Food in the Last Year: Never true    Ran Out of Food in the Last Year: Never true   Transportation Needs: Not on file   Physical Activity: Not on file   Stress: Not on file   Social Connections: Not on file   Intimate Partner Violence: Not on file   Housing Stability: Not on file     Current Outpatient Medications on File Prior to Visit   Medication Sig Dispense Refill    gabapentin (NEURONTIN) 300 MG capsule TAKE 1 CAPSULE BY MOUTH AT BEDTIME      baclofen (LIORESAL) 10 MG tablet Take 1 tablet by mouth 3 times daily 30 tablet 0    prazosin (MINIPRESS) 1 MG capsule TAKE 1 CAPSULE BY MOUTH AT BEDTIME      traZODone (DESYREL) 100 MG tablet TAKE 1 TABLET BY MOUTH AT BEDTIME AS NEEDED FOR SLEEP      TRINTELLIX 10 MG TABS tablet TAKE 1 TABLET BY MOUTH EVERY DAY      gabapentin (NEURONTIN) 100 MG capsule Take 1 capsule by mouth 2 times daily for 90 days. Intended supply: 90 days 180 capsule 3    albuterol sulfate HFA (VENTOLIN HFA) 108 (90 Base) MCG/ACT inhaler Inhale 2 puffs into the lungs every 6 hours as needed for Wheezing or Shortness of Breath 2 each 3    albuterol (PROVENTIL) (2.5 MG/3ML) 0.083% nebulizer solution Take 3 mLs by nebulization every 6 hours as needed for Wheezing 120 each 3    Multiple Vitamins-Minerals (MULTIVITAMIN ADULTS PO) Take by mouth daily      vitamin D (CHOLECALCIFEROL) 1000 UNIT TABS tablet Take 1,000 Units by mouth daily      naproxen (NAPROSYN) 500 MG tablet Take 1 tablet by mouth 2 times daily as needed for Pain (with meals) 20 tablet 0     No current facility-administered medications on file prior to visit.     :  Latex, Bismuth subsalicylate, Antibacterial hand soap [triclosan], Kiwi extract, Strawberry extract, Viibryd [vilazodone hcl], and Effexor [venlafaxine]    Review of Systems   Constitutional:  Negative for chills, diaphoresis and fever. HENT:  Negative for congestion, sore throat and tinnitus. Eyes:  Negative for photophobia, pain and redness. Respiratory:  Negative for cough, shortness of breath and wheezing. Cardiovascular:  Negative for chest pain, palpitations and leg swelling. Gastrointestinal:  Negative for abdominal pain, diarrhea, nausea and vomiting. Genitourinary:  Negative for dysuria, flank pain, frequency and urgency. Musculoskeletal:  Positive for back pain. Negative for myalgias. Skin:  Negative for rash. Neurological:  Negative for dizziness, tremors, seizures, weakness and headaches. Hematological:  Does not bruise/bleed easily. Psychiatric/Behavioral:  The patient is not nervous/anxious.       Objective:   /70   Pulse (!) 106   Temp 97 °F (36.1 °C) (Temporal)  5' 6.5\" (1.689 m) Comment: per pt  Wt 168 lb (76.2 kg) Comment: per pt  LMP 09/24/2022 (Approximate)   SpO2 97%   BMI 26.71 kg/m²     Physical Exam  Constitutional:       General: She is not in acute distress. Appearance: She is not diaphoretic. HENT:      Head: Normocephalic and atraumatic. No Hi's sign, right periorbital erythema or left periorbital erythema. Jaw: No trismus. Right Ear: There is no impacted cerumen. Left Ear: There is no impacted cerumen. Mouth/Throat:      Pharynx: No oropharyngeal exudate. Eyes:      General: No scleral icterus. Extraocular Movements:      Right eye: Normal extraocular motion. Left eye: Normal extraocular motion. Conjunctiva/sclera: Conjunctivae normal.      Right eye: Right conjunctiva is not injected. Left eye: Left conjunctiva is not injected. Pupils: Pupils are equal, round, and reactive to light. Neck:      Thyroid: No thyromegaly. Trachea: Trachea normal. No tracheal deviation. Meningeal: Brudzinski's sign absent. Cardiovascular:      Rate and Rhythm: Regular rhythm. Tachycardia present. Heart sounds: Normal heart sounds. No murmur heard. No gallop. Pulmonary:      Effort: Pulmonary effort is normal.      Breath sounds: Normal breath sounds. No wheezing or rales. Abdominal:      General: Bowel sounds are normal. There is no distension. Palpations: Abdomen is soft. Tenderness: There is no abdominal tenderness. There is no guarding. Musculoskeletal:      Cervical back: Normal range of motion and neck supple. Thoracic back: Normal.      Lumbar back: Swelling and tenderness present. No edema. Decreased range of motion. Negative right straight leg raise test and negative left straight leg raise test.        Back:    Lymphadenopathy:      Cervical: No cervical adenopathy. Skin:     General: Skin is warm and dry. Coloration: Skin is not pale.       Findings: No erythema or rash. Neurological:      Mental Status: She is alert and oriented to person, place, and time. Cranial Nerves: No cranial nerve deficit. Coordination: Coordination normal.       Procedures   :       Diagnosis Orders   1. Low back pain with sciatica, sciatica laterality unspecified, unspecified back pain laterality, unspecified chronicity  XR LUMBAR SPINE (2-3 VIEWS)    methylPREDNISolone (MEDROL, GENIA,) 4 MG tablet      2. Back pain with sciatica            :      Orders Placed This Encounter   Procedures    XR LUMBAR SPINE (2-3 VIEWS)     Standing Status:   Future     Standing Expiration Date:   10/1/2023     Pt states pain over the left buttock, but it goes down her leg. She has increased sensation   She is already on gabapentin and has left over baclofen - we will add steroid and xray and follow up with pcp next week for further re-assessment and referrals as needed   She has baclofen left form last time she was seen for similar problems   Orders Placed This Encounter   Medications    methylPREDNISolone (MEDROL, GENIA,) 4 MG tablet     Sig: Take by mouth. Dispense:  1 kit     Refill:  0       Return in about 4 days (around 10/5/2022), or if symptoms worsen or fail to improve and follow up with pcp. Reviewed with the patient: current clinicalstatus, medications, activities and diet. Side effects, adverse effects of the medication prescribedtoday, as well as treatment plan/ rationale and result expectations have been discussedwith the patient who expresses understanding and desires to proceed. Close follow upto evaluate treatment results and for coordination of care. I have reviewedthe patient's medical history in detail and updated the computerized patient record.     Steve Prajapati, GREGORY - CNP

## 2022-10-14 ENCOUNTER — OFFICE VISIT (OUTPATIENT)
Dept: PRIMARY CARE CLINIC | Age: 42
End: 2022-10-14
Payer: COMMERCIAL

## 2022-10-14 VITALS
OXYGEN SATURATION: 97 % | BODY MASS INDEX: 27.06 KG/M2 | RESPIRATION RATE: 18 BRPM | TEMPERATURE: 98 F | DIASTOLIC BLOOD PRESSURE: 64 MMHG | WEIGHT: 172.4 LBS | SYSTOLIC BLOOD PRESSURE: 120 MMHG | HEIGHT: 67 IN | HEART RATE: 73 BPM

## 2022-10-14 DIAGNOSIS — R55 SYNCOPE AND COLLAPSE: ICD-10-CM

## 2022-10-14 DIAGNOSIS — Z23 NEED FOR INFLUENZA VACCINATION: ICD-10-CM

## 2022-10-14 DIAGNOSIS — Z00.00 HEALTH CARE MAINTENANCE: ICD-10-CM

## 2022-10-14 DIAGNOSIS — R55 SYNCOPE AND COLLAPSE: Primary | ICD-10-CM

## 2022-10-14 DIAGNOSIS — Z12.31 ENCOUNTER FOR MAMMOGRAM TO ESTABLISH BASELINE MAMMOGRAM: ICD-10-CM

## 2022-10-14 LAB
ALBUMIN SERPL-MCNC: 4.5 G/DL (ref 3.5–4.6)
ALP BLD-CCNC: 59 U/L (ref 40–130)
ALT SERPL-CCNC: 27 U/L (ref 0–33)
ANION GAP SERPL CALCULATED.3IONS-SCNC: 12 MEQ/L (ref 9–15)
AST SERPL-CCNC: 28 U/L (ref 0–35)
BASOPHILS ABSOLUTE: 0 K/UL (ref 0–0.2)
BASOPHILS RELATIVE PERCENT: 0.5 %
BILIRUB SERPL-MCNC: 0.4 MG/DL (ref 0.2–0.7)
BUN BLDV-MCNC: 7 MG/DL (ref 6–20)
CALCIUM SERPL-MCNC: 9.3 MG/DL (ref 8.5–9.9)
CHLORIDE BLD-SCNC: 102 MEQ/L (ref 95–107)
CHP ED QC CHECK: NORMAL
CO2: 25 MEQ/L (ref 20–31)
CREAT SERPL-MCNC: 0.61 MG/DL (ref 0.5–0.9)
EOSINOPHILS ABSOLUTE: 0.2 K/UL (ref 0–0.7)
EOSINOPHILS RELATIVE PERCENT: 1.9 %
GFR AFRICAN AMERICAN: >60
GFR NON-AFRICAN AMERICAN: >60
GLOBULIN: 2.5 G/DL (ref 2.3–3.5)
GLUCOSE BLD-MCNC: 87 MG/DL (ref 70–99)
GLUCOSE BLD-MCNC: 98 MG/DL
HCT VFR BLD CALC: 37.2 % (ref 37–47)
HEMOGLOBIN: 12 G/DL (ref 12–16)
LYMPHOCYTES ABSOLUTE: 2.4 K/UL (ref 1–4.8)
LYMPHOCYTES RELATIVE PERCENT: 26.8 %
MCH RBC QN AUTO: 26.8 PG (ref 27–31.3)
MCHC RBC AUTO-ENTMCNC: 32.3 % (ref 33–37)
MCV RBC AUTO: 82.9 FL (ref 82–100)
MONOCYTES ABSOLUTE: 0.8 K/UL (ref 0.2–0.8)
MONOCYTES RELATIVE PERCENT: 8.7 %
NEUTROPHILS ABSOLUTE: 5.6 K/UL (ref 1.4–6.5)
NEUTROPHILS RELATIVE PERCENT: 62.1 %
PDW BLD-RTO: 15.1 % (ref 11.5–14.5)
PLATELET # BLD: 370 K/UL (ref 130–400)
POTASSIUM SERPL-SCNC: 4.1 MEQ/L (ref 3.4–4.9)
RBC # BLD: 4.49 M/UL (ref 4.2–5.4)
SODIUM BLD-SCNC: 139 MEQ/L (ref 135–144)
TOTAL PROTEIN: 7 G/DL (ref 6.3–8)
WBC # BLD: 9 K/UL (ref 4.8–10.8)

## 2022-10-14 PROCEDURE — G8427 DOCREV CUR MEDS BY ELIG CLIN: HCPCS | Performed by: INTERNAL MEDICINE

## 2022-10-14 PROCEDURE — 1036F TOBACCO NON-USER: CPT | Performed by: INTERNAL MEDICINE

## 2022-10-14 PROCEDURE — 99214 OFFICE O/P EST MOD 30 MIN: CPT | Performed by: INTERNAL MEDICINE

## 2022-10-14 PROCEDURE — G8419 CALC BMI OUT NRM PARAM NOF/U: HCPCS | Performed by: INTERNAL MEDICINE

## 2022-10-14 PROCEDURE — G8482 FLU IMMUNIZE ORDER/ADMIN: HCPCS | Performed by: INTERNAL MEDICINE

## 2022-10-14 PROCEDURE — 82962 GLUCOSE BLOOD TEST: CPT | Performed by: INTERNAL MEDICINE

## 2022-10-14 PROCEDURE — 93000 ELECTROCARDIOGRAM COMPLETE: CPT | Performed by: INTERNAL MEDICINE

## 2022-10-14 PROCEDURE — 90674 CCIIV4 VAC NO PRSV 0.5 ML IM: CPT | Performed by: INTERNAL MEDICINE

## 2022-10-14 ASSESSMENT — ENCOUNTER SYMPTOMS
NAUSEA: 0
COUGH: 0
ABDOMINAL PAIN: 0
DIARRHEA: 0
VOMITING: 0
SHORTNESS OF BREATH: 0
WHEEZING: 0

## 2022-10-14 NOTE — PROGRESS NOTES
Vaccine Information Sheet, \"Influenza - Inactivated\"  given to Tiara Cuellar, or parent/legal guardian of  Tiara Cuelalr and verbalized understanding. Patient responses:    Have you ever had a reaction to a flu vaccine? No  Do you have any current illness? No  Have you ever had Guillian [place] Syndrome? No  Do you have a serious allergy to any of the follow: Neomycin, Polymyxin, Thimerosal, eggs or egg products? No    Flu vaccine given per order. Please see immunization tab. Risks and benefits explained. Current VIS given.       Immunizations Administered       Name Date Dose Route    Influenza, FLUCELVAX, (age 10 mo+), MDCK, PF, 0.5mL 10/14/2022 0.5 mL Intramuscular    Site: Deltoid- Left    Lot: [id number]    NDC: 26101-672-83          After obtaining consent, and per orders of Dr. Jaye Clifford, injection of flu given in Left deltoid by Maksim Beckham MA.

## 2022-10-14 NOTE — PROGRESS NOTES
Subjective:      Patient ID: Nany Kingston is a 36 y.o. female    Follow up   HPI  Pt presents for follow up. Fibromyalgia stable on gabapentin. [place] with rheumatology. Persistent lightheadedness and LOC since teenage years. Preceded by lightheadedness and spinning sensation; Feels like blood draining from her head down to the feet when she gets up too fast. Last episode a few days ago at work. Attests to adequate water intake, no starvation. No CP, no palpitations, no SOB or seizures, no slurred speech or weakness. No drugs or alcohol. Results for POC orders placed in visit on 10/14/22   POCT Glucose   Result Value Ref Range    Glucose 98 mg/dL    QC OK? No bloody or black stools. No fever or chills. No sinus congestion No vomiting or diarrhea.     Past Medical History:   Diagnosis Date    Allergic rhinitis     Asthma     Chronic ear infection     Depressed     Double, Major and Dystemic    Fainting     since age 14    Fibromyalgia     Headache     Scoliosis     Vertigo     Vision loss     poor perpherial vision, doesn't drive     Past Surgical History:   Procedure Laterality Date    COLONOSCOPY N/A 10/21/2021    COLONOSCOPY WITH POLYPECTOMY performed by Claude Mile, MD at [address] [facility] History     Socioeconomic History    Marital status:      Spouse name: Not on file    Number of children: Not on file    Years of education: Not on file    Highest education level: Not on file   Occupational History    Not on file   Tobacco Use    Smoking status: Never    Smokeless tobacco: Never   Vaping Use    Vaping Use: Never used   Substance and Sexual Activity    Alcohol use: Yes     Comment: rarely    Drug use: No    Sexual activity: Not on file   Other Topics Concern    Not on file   Social History Narrative    Not on file     Social Determinants of Health     Financial Resource Strain: Low Risk     Difficulty of Paying Living Expenses: Not hard at all   Food Insecurity: No Food Insecurity    Worried About Running Out of Food in the Last Year: Never true    Ran Out of Food in the Last Year: Never true   Transportation Needs: Not on file   Physical Activity: Not on file   Stress: Not on file   Social Connections: Not on file   Intimate Partner Violence: Not on file   Housing Stability: Not on file     Family History   Problem Relation Age of Onset    Diabetes Mother 15    Arthritis Mother     Kidney Disease Mother         stage 3    Heart Disease Mother         CAD    Other Mother         Neurophathy, eye problems    Clotting Disorder Mother         Brain    Asthma Father     Diabetes Sister         due to mom DM    Alcohol Abuse Neg Hx     Allergy (Severe) Neg Hx     Anemia Neg Hx     Arrhythmia Neg Hx     Atrial Fibrillation Neg Hx     Birth Defects Neg Hx     Breast Cancer Neg Hx     Cancer Neg Hx     Coronary Art Dis Neg Hx     Colon Cancer Neg Hx     Depression Neg Hx     Early Death Neg Hx     Heart Attack Neg Hx     Hearing Loss Neg Hx     High Blood Pressure Neg Hx     High Cholesterol Neg Hx     Learning Disabilities Neg Hx     Mental Illness Neg Hx     Mental Retardation Neg Hx     Miscarriages / Stillbirths Neg Hx     Obesity Neg Hx     Osteoporosis Neg Hx     Prostate Cancer Neg Hx     Stroke Neg Hx     Substance Abuse Neg Hx     Vision Loss Neg Hx    Allergies:  Latex, Bismuth subsalicylate, Antibacterial hand soap [triclosan], Kiwi extract, Strawberry extract, Viibryd [vilazodone hcl], and Effexor [venlafaxine]  Patient Active Problem List   Diagnosis    Asthma    Allergic rhinitis    Chronic ear infection    Depressed    Fainting    Headache    Scoliosis    Vertigo    Vision loss    Polyp of colon    Back pain with sciatica     Current Outpatient Medications on File Prior to Visit   Medication Sig Dispense Refill    gabapentin (NEURONTIN) 300 MG capsule TAKE 1 CAPSULE BY MOUTH AT BEDTIME      prazosin (MINIPRESS) 1 MG capsule TAKE 1 CAPSULE BY MOUTH AT BEDTIME traZODone (DESYREL) 100 MG tablet TAKE 1 TABLET BY MOUTH AT BEDTIME AS NEEDED FOR SLEEP      TRINTELLIX 10 MG TABS tablet TAKE 1 TABLET BY MOUTH EVERY DAY      albuterol sulfate HFA (VENTOLIN HFA) 108 (90 Base) MCG/ACT inhaler Inhale 2 puffs into the lungs every 6 hours as needed for Wheezing or Shortness of Breath 2 each 3    albuterol (PROVENTIL) (2.5 MG/3ML) 0.083% nebulizer solution Take 3 mLs by nebulization every 6 hours as needed for Wheezing 120 each 3    Multiple Vitamins-Minerals (MULTIVITAMIN ADULTS PO) Take by mouth daily      vitamin D (CHOLECALCIFEROL) 1000 UNIT TABS tablet Take 1,000 Units by mouth daily      naproxen (NAPROSYN) 500 MG tablet Take 1 tablet by mouth 2 times daily as needed for Pain (with meals) 20 tablet 0    baclofen (LIORESAL) 10 MG tablet Take 1 tablet by mouth 3 times daily 30 tablet 0    gabapentin (NEURONTIN) 100 MG capsule Take 1 capsule by mouth 2 times daily for 90 days. Intended supply: 90 days 180 capsule 3     No current facility-administered medications on file prior to visit. Review of Systems   Constitutional:  Negative for chills, diaphoresis, fatigue and fever. HENT:  Negative for congestion, ear discharge and ear pain. Respiratory:  Negative for cough, shortness of breath and wheezing. Cardiovascular:  Negative for chest pain. Gastrointestinal:  Negative for abdominal pain, diarrhea, nausea and vomiting. Endocrine: Negative for cold intolerance and heat intolerance. Genitourinary:  Negative for dysuria and frequency. Neurological:  Positive for syncope and light-headedness. Negative for dizziness, tremors, seizures, facial asymmetry, speech difficulty, weakness, numbness and headaches.      Objective:   /64   Pulse 73   Temp 98 °F (36.7 °C)   Resp 18   Ht 5' 6.5\" (1.689 m)   Wt 172 lb 6.4 oz (78.2 kg)   LMP 09/24/2022 (Approximate)   SpO2 97%   BMI 27.41 kg/m²     Physical Exam  Constitutional:       General: She is not in acute distress. Appearance: She is not diaphoretic. Cardiovascular:      Rate and Rhythm: Normal rate and regular rhythm. Pulses: Normal pulses. Heart sounds: Normal heart sounds, S1 normal and S2 normal.   Pulmonary:      Effort: Pulmonary effort is normal. No respiratory distress. Breath sounds: Normal breath sounds. No wheezing or rales. Chest:      Chest wall: No tenderness. Abdominal:      General: Bowel sounds are normal.      Tenderness: There is no abdominal tenderness. Neurological:      General: No focal deficit present. Mental Status: She is alert. Cranial Nerves: No cranial nerve deficit. Psychiatric:         Mood and Affect: Mood normal.         Behavior: Behavior normal.         Thought Content: Thought content normal.     Assessment:       Diagnosis Orders   1. Syncope and collapse  External Referral to Neurology    [facility] [place], DO [place], Cardiology, [place]    POCT Glucose    CBC with Auto Differential    Comprehensive Metabolic Panel    EKG 12 Lead      2. Health care maintenance        3. Encounter for mammogram to establish baseline mammogram  YADY DIGITAL SCREEN W OR WO CAD BILATERAL      4.  Need for influenza vaccination  Influenza, FLUCELVAX, (age 10 mo+), IM, Preservative Free, 0.5 mL        Plan:      Orders Placed This Encounter   Procedures    YADY DIGITAL SCREEN W OR WO CAD BILATERAL     Standing Status:   Future     Standing Expiration Date:   12/14/2023    Influenza, FLUCELVAX, (age 10 mo+), IM, Preservative Free, 0.5 mL    CBC with Auto Differential     Standing Status:   Future     Number of Occurrences:   1     Standing Expiration Date:   10/14/2023    Comprehensive Metabolic Panel     Standing Status:   Future     Number of Occurrences:   1     Standing Expiration Date:   10/14/2023    External Referral to Neurology     Referral Priority:   Routine     Referral Type:   Eval and Treat     Referral Reason:   Specialty Services Required     Referred to Provider:   Emil Arias MD     Requested Specialty:   Neurology     Number of Visits Requested:   [address], [place], Cardiology, [place]     Referral Priority:   Routine     Referral Type:   Eval and Treat     Referral Reason:   Specialty Services Required     Referred to Provider:   Lyubov Wolfe DO     Requested Specialty:   Cardiology     Number of Visits Requested:   1    POCT Glucose    EKG 12 Lead     Order Specific Question:   Reason for Exam?     Answer:   Syncope     No orders of the defined types were placed in this encounter. Return in about 1 month (around 11/14/2022) for follow up, with PCP.

## 2022-10-28 ENCOUNTER — HOSPITAL ENCOUNTER (OUTPATIENT)
Dept: WOMENS IMAGING | Age: 42
Discharge: HOME | End: 2022-10-30
Payer: COMMERCIAL

## 2022-10-28 DIAGNOSIS — Z12.31 ENCOUNTER FOR MAMMOGRAM TO ESTABLISH BASELINE MAMMOGRAM: ICD-10-CM

## 2022-10-28 DIAGNOSIS — Z12.31 ENCOUNTER FOR SCREENING MAMMOGRAM FOR BREAST CANCER: ICD-10-CM

## 2022-10-28 PROCEDURE — 77063 BREAST TOMOSYNTHESIS BI: CPT

## 2022-10-30 DIAGNOSIS — R92.8 ABNORMAL MAMMOGRAM: Primary | ICD-10-CM

## 2022-11-04 ENCOUNTER — HOSPITAL ENCOUNTER (OUTPATIENT)
Dept: ULTRASOUND IMAGING | Age: 42
Discharge: HOME | End: 2022-11-06
Payer: COMMERCIAL

## 2022-11-04 ENCOUNTER — HOSPITAL ENCOUNTER (OUTPATIENT)
Dept: WOMENS IMAGING | Age: 42
Discharge: HOME | End: 2022-11-06
Payer: COMMERCIAL

## 2022-11-04 DIAGNOSIS — R92.8 ABNORMAL MAMMOGRAM: ICD-10-CM

## 2022-11-04 PROCEDURE — G0279 TOMOSYNTHESIS, MAMMO: HCPCS

## 2022-11-11 ENCOUNTER — OFFICE VISIT (OUTPATIENT)
Dept: PRIMARY CARE CLINIC | Age: 42
End: 2022-11-11
Payer: COMMERCIAL

## 2022-11-11 ENCOUNTER — OFFICE VISIT (OUTPATIENT)
Dept: CARDIOLOGY CLINIC | Age: 42
End: 2022-11-11
Payer: COMMERCIAL

## 2022-11-11 VITALS
RESPIRATION RATE: 18 BRPM | HEART RATE: 84 BPM | SYSTOLIC BLOOD PRESSURE: 124 MMHG | TEMPERATURE: 97.8 F | DIASTOLIC BLOOD PRESSURE: 70 MMHG | BODY MASS INDEX: 27 KG/M2 | HEIGHT: 67 IN | WEIGHT: 172 LBS | OXYGEN SATURATION: 99 %

## 2022-11-11 VITALS
DIASTOLIC BLOOD PRESSURE: 70 MMHG | SYSTOLIC BLOOD PRESSURE: 110 MMHG | HEART RATE: 102 BPM | WEIGHT: 173 LBS | BODY MASS INDEX: 27.5 KG/M2

## 2022-11-11 DIAGNOSIS — G90.A POTS (POSTURAL ORTHOSTATIC TACHYCARDIA SYNDROME): Primary | ICD-10-CM

## 2022-11-11 DIAGNOSIS — R55 VASOVAGAL SYNCOPE: ICD-10-CM

## 2022-11-11 DIAGNOSIS — G90.A POTS (POSTURAL ORTHOSTATIC TACHYCARDIA SYNDROME): ICD-10-CM

## 2022-11-11 PROCEDURE — G8419 CALC BMI OUT NRM PARAM NOF/U: HCPCS | Performed by: INTERNAL MEDICINE

## 2022-11-11 PROCEDURE — G8482 FLU IMMUNIZE ORDER/ADMIN: HCPCS | Performed by: INTERNAL MEDICINE

## 2022-11-11 PROCEDURE — G8427 DOCREV CUR MEDS BY ELIG CLIN: HCPCS | Performed by: INTERNAL MEDICINE

## 2022-11-11 PROCEDURE — 1036F TOBACCO NON-USER: CPT | Performed by: INTERNAL MEDICINE

## 2022-11-11 PROCEDURE — 99203 OFFICE O/P NEW LOW 30 MIN: CPT | Performed by: INTERNAL MEDICINE

## 2022-11-11 PROCEDURE — 99213 OFFICE O/P EST LOW 20 MIN: CPT | Performed by: INTERNAL MEDICINE

## 2022-11-11 ASSESSMENT — ENCOUNTER SYMPTOMS
SHORTNESS OF BREATH: 0
WHEEZING: 0
NAUSEA: 0
COUGH: 0
DIARRHEA: 0
VOMITING: 0
ABDOMINAL PAIN: 0

## 2022-11-11 NOTE — PROGRESS NOTES
Subjective:      Patient ID: Anastasia Nagy is a 36 y.o. female    Follow up   HPI  Pt presents for follow up regarding recurrent lightheadedness and LOC. Seen by cardio and diagnosed with vasovagal vs POTS. Prazosin prescribed fir nightmares but helps with control on lightheadedness. Will use compression stockings and increase fluid and salt intake.    Past Medical History:   Diagnosis Date    Allergic rhinitis     Asthma     Chronic ear infection     Depressed     Double, Major and Dystemic    Fainting     since age 14    Fibromyalgia     Headache     POTS (postural orthostatic tachycardia syndrome) 11/11/2022    Scoliosis     Vasovagal syncope 11/11/2022    Vertigo     Vision loss     poor perpherial vision, doesn't drive     Past Surgical History:   Procedure Laterality Date    COLONOSCOPY N/A 10/21/2021    COLONOSCOPY WITH POLYPECTOMY performed by Mohamud Aguilera MD at [address] [facility] History     Socioeconomic History    Marital status:      Spouse name: Not on file    Number of children: Not on file    Years of education: Not on file    Highest education level: Not on file   Occupational History    Not on file   Tobacco Use    Smoking status: Never    Smokeless tobacco: Never   Vaping Use    Vaping Use: Never used   Substance and Sexual Activity    Alcohol use: Yes     Comment: rarely    Drug use: No    Sexual activity: Not on file   Other Topics Concern    Not on file   Social History Narrative    Not on file     Social Determinants of Health     Financial Resource Strain: Low Risk     Difficulty of Paying Living Expenses: Not hard at all   Food Insecurity: No Food Insecurity    Worried About Running Out of Food in the Last Year: Never true    Ran Out of Food in the Last Year: Never true   Transportation Needs: Not on file   Physical Activity: Not on file   Stress: Not on file   Social Connections: Not on file   Intimate Partner Violence: Not on file   Housing Stability: Not on file     Family History   Problem Relation Age of Onset    Diabetes Mother 15    Arthritis Mother     Kidney Disease Mother         stage 3    Heart Disease Mother         CAD    Other Mother         Neurophathy, eye problems    Clotting Disorder Mother         Brain    Asthma Father     Diabetes Sister         due to mom DM    Alcohol Abuse Neg Hx     Allergy (Severe) Neg Hx     Anemia Neg Hx     Arrhythmia Neg Hx     Atrial Fibrillation Neg Hx     Birth Defects Neg Hx     Breast Cancer Neg Hx     Cancer Neg Hx     Coronary Art Dis Neg Hx     Colon Cancer Neg Hx     Depression Neg Hx     Early Death Neg Hx     Heart Attack Neg Hx     Hearing Loss Neg Hx     High Blood Pressure Neg Hx     High Cholesterol Neg Hx     Learning Disabilities Neg Hx     Mental Illness Neg Hx     Mental Retardation Neg Hx     Miscarriages / Stillbirths Neg Hx     Obesity Neg Hx     Osteoporosis Neg Hx     Prostate Cancer Neg Hx     Stroke Neg Hx     Substance Abuse Neg Hx     Vision Loss Neg Hx      Allergies:  Latex, Bismuth subsalicylate, Antibacterial hand soap [triclosan], Kiwi extract, Strawberry extract, Viibryd [vilazodone hcl], and Effexor [venlafaxine]  Patient Active Problem List   Diagnosis    Asthma    Allergic rhinitis    Chronic ear infection    Depressed    Fainting    Headache    Scoliosis    Vertigo    Vision loss    Polyp of colon    Back pain with sciatica    POTS (postural orthostatic tachycardia syndrome)    Vasovagal syncope     Current Outpatient Medications on File Prior to Visit   Medication Sig Dispense Refill    gabapentin (NEURONTIN) 300 MG capsule TAKE 1 CAPSULE BY MOUTH AT BEDTIME      baclofen (LIORESAL) 10 MG tablet Take 1 tablet by mouth 3 times daily 30 tablet 0    prazosin (MINIPRESS) 1 MG capsule TAKE 1 CAPSULE BY MOUTH AT BEDTIME      traZODone (DESYREL) 100 MG tablet TAKE 1 TABLET BY MOUTH AT BEDTIME AS NEEDED FOR SLEEP      TRINTELLIX 10 MG TABS tablet TAKE 1 TABLET BY MOUTH EVERY DAY albuterol sulfate HFA (VENTOLIN HFA) 108 (90 Base) MCG/ACT inhaler Inhale 2 puffs into the lungs every 6 hours as needed for Wheezing or Shortness of Breath 2 each 3    albuterol (PROVENTIL) (2.5 MG/3ML) 0.083% nebulizer solution Take 3 mLs by nebulization every 6 hours as needed for Wheezing 120 each 3    Multiple Vitamins-Minerals (MULTIVITAMIN ADULTS PO) Take by mouth daily      vitamin D (CHOLECALCIFEROL) 1000 UNIT TABS tablet Take 1,000 Units by mouth daily      naproxen (NAPROSYN) 500 MG tablet Take 1 tablet by mouth 2 times daily as needed for Pain (with meals) 20 tablet 0    gabapentin (NEURONTIN) 100 MG capsule Take 1 capsule by mouth 2 times daily for 90 days. Intended supply: 90 days 180 capsule 3     No current facility-administered medications on file prior to visit. Review of Systems   Respiratory:  Negative for cough, shortness of breath and wheezing. Cardiovascular:  Negative for chest pain. Gastrointestinal:  Negative for abdominal pain, diarrhea, nausea and vomiting. Endocrine: Negative for cold intolerance and heat intolerance. Genitourinary:  Negative for dysuria and frequency. Neurological:  Positive for light-headedness. Negative for dizziness. Objective:   /70   Pulse 84   Temp 97.8 °F (36.6 °C)   Resp 18   Ht 5' 6.5\" (1.689 m)   Wt 172 lb (78 kg)   SpO2 99%   BMI 27.35 kg/m²     Physical Exam  Constitutional:       General: She is not in acute distress. Appearance: She is not diaphoretic. Cardiovascular:      Rate and Rhythm: Normal rate and regular rhythm. Pulses: Normal pulses. Heart sounds: Normal heart sounds, S1 normal and S2 normal.   Pulmonary:      Effort: Pulmonary effort is normal. No respiratory distress. Breath sounds: Normal breath sounds. No wheezing or rales. Chest:      Chest wall: No tenderness. Abdominal:      General: Bowel sounds are normal.      Tenderness: There is no abdominal tenderness.    Neurological: General: No focal deficit present. Mental Status: She is alert. Cranial Nerves: No cranial nerve deficit. Psychiatric:         Mood and Affect: Mood normal.     Assessment:       Diagnosis Orders   1. POTS (postural orthostatic tachycardia syndrome)      will follow with cardio. Stable now on Prazosin         Plan:      No orders of the defined types were placed in this encounter. Return in about 6 months (around 5/11/2023) for follow up, with PCP.

## 2022-11-11 NOTE — PROGRESS NOTES
Chief Complaint   Patient presents with    Establish Cardiologist     DR CORDERO REFERRING     Loss of Consciousness       Patient presents for initial medical evaluation. Patient is followed on a regular basis by Dr. Fani Barry MD. Presents with fainting and passing out, happening since the age of 5. When getting up in am and event during day, from a seated position and passes out for a few seconds. No hx of any major trauma from episodes. Hx of negative stress test and echo in 2019  Was placed on prazosin for nightmares and symptoms got better. Pt denies chest pain, dyspnea, dyspnea on exertion, change in exercise capacity, fatigue,  nausea, vomiting, diarrhea, constipation, motor weakness, insomnia, weight loss, , palpitations, PND, orthopnea, or claudication. EKG is normal.   No hx of DM, HTN or HLD. No smoking.            Patient Active Problem List   Diagnosis    Asthma    Allergic rhinitis    Chronic ear infection    Depressed    Fainting    Headache    Scoliosis    Vertigo    Vision loss    Polyp of colon    Back pain with sciatica    POTS (postural orthostatic tachycardia syndrome)    Vasovagal syncope       Past Surgical History:   Procedure Laterality Date    COLONOSCOPY N/A 10/21/2021    COLONOSCOPY WITH POLYPECTOMY performed by Jolynn De La Cruz MD at [address] History     Socioeconomic History    Marital status:    Tobacco Use    Smoking status: Never    Smokeless tobacco: Never   Vaping Use    Vaping Use: Never used   Substance and Sexual Activity    Alcohol use: Yes     Comment: rarely    Drug use: No     Social Determinants of Health     Financial Resource Strain: Low Risk     Difficulty of Paying Living Expenses: Not hard at all   Food Insecurity: No Food Insecurity    Worried About Running Out of Food in the Last Year: Never true    Ran Out of Food in the Last Year: Never true       Family History   Problem Relation Age of Onset    Diabetes Mother 15 Arthritis Mother     Kidney Disease Mother         stage 3    Heart Disease Mother         CAD    Other Mother         Neurophathy, eye problems    Clotting Disorder Mother         Brain    Asthma Father     Diabetes Sister         due to mom DM    Alcohol Abuse Neg Hx     Allergy (Severe) Neg Hx     Anemia Neg Hx     Arrhythmia Neg Hx     Atrial Fibrillation Neg Hx     Birth Defects Neg Hx     Breast Cancer Neg Hx     Cancer Neg Hx     Coronary Art Dis Neg Hx     Colon Cancer Neg Hx     Depression Neg Hx     Early Death Neg Hx     Heart Attack Neg Hx     Hearing Loss Neg Hx     High Blood Pressure Neg Hx     High Cholesterol Neg Hx     Learning Disabilities Neg Hx     Mental Illness Neg Hx     Mental Retardation Neg Hx     Miscarriages / Stillbirths Neg Hx     Obesity Neg Hx     Osteoporosis Neg Hx     Prostate Cancer Neg Hx     Stroke Neg Hx     Substance Abuse Neg Hx     Vision Loss Neg Hx        Current Outpatient Medications   Medication Sig Dispense Refill    Elastic Bandages & Supports (JOBST ACTIVE 20-30MMHG MEDIUM) MISC Thigh-high compression stockings, 20 to 30 mmHg 1 each 3    gabapentin (NEURONTIN) 300 MG capsule TAKE 1 CAPSULE BY MOUTH AT BEDTIME      baclofen (LIORESAL) 10 MG tablet Take 1 tablet by mouth 3 times daily 30 tablet 0    prazosin (MINIPRESS) 1 MG capsule TAKE 1 CAPSULE BY MOUTH AT BEDTIME      traZODone (DESYREL) 100 MG tablet TAKE 1 TABLET BY MOUTH AT BEDTIME AS NEEDED FOR SLEEP      TRINTELLIX 10 MG TABS tablet TAKE 1 TABLET BY MOUTH EVERY DAY      albuterol sulfate HFA (VENTOLIN HFA) 108 (90 Base) MCG/ACT inhaler Inhale 2 puffs into the lungs every 6 hours as needed for Wheezing or Shortness of Breath 2 each 3    albuterol (PROVENTIL) (2.5 MG/3ML) 0.083% nebulizer solution Take 3 mLs by nebulization every 6 hours as needed for Wheezing 120 each 3    Multiple Vitamins-Minerals (MULTIVITAMIN ADULTS PO) Take by mouth daily      vitamin D (CHOLECALCIFEROL) 1000 UNIT TABS tablet Take 1,000 Units by mouth daily      naproxen (NAPROSYN) 500 MG tablet Take 1 tablet by mouth 2 times daily as needed for Pain (with meals) 20 tablet 0    gabapentin (NEURONTIN) 100 MG capsule Take 1 capsule by mouth 2 times daily for 90 days. Intended supply: 90 days 180 capsule 3     No current facility-administered medications for this visit. Latex, Bismuth subsalicylate, Antibacterial hand soap [triclosan], Kiwi extract, Strawberry extract, Viibryd [vilazodone hcl], and Effexor [venlafaxine]    Review of Systems:  General ROS: negative  Psychological ROS: negative  Hematological and Lymphatic ROS: No history of blood clots or bleeding disorder. Respiratory ROS: no cough, shortness of breath, or wheezing  Cardiovascular ROS: no chest pain or dyspnea on exertion  Gastrointestinal ROS: no abdominal pain, change in bowel habits, or black or bloody stools  Genito-Urinary ROS: no dysuria, trouble voiding, or hematuria  Musculoskeletal ROS: negative  Neurological ROS: no TIA or stroke symptoms  Dermatological ROS: negative    VITALS:  Blood pressure 110/70, pulse (!) 102, weight 173 lb (78.5 kg), not currently breastfeeding. Body mass index is 27.5 kg/m². Physical Examination:  General appearance - alert, well appearing, and in no distress  Mental status - alert, oriented to person, place, and time  Neck - Neck is supple, no JVD or carotid bruits. No thyromegaly or adenopathy.    Chest - clear to auscultation, no wheezes, rales or rhonchi, symmetric air entry  Heart - normal rate, regular rhythm, normal S1, S2, no murmurs, rubs, clicks or gallops  Abdomen - soft, nontender, nondistended, no masses or organomegaly  Neurological - alert, oriented, normal speech, no focal findings or movement disorder noted  Extremities - peripheral pulses normal, no pedal edema, no clubbing or cyanosis  Skin - normal coloration and turgor, no rashes, no suspicious skin lesions noted      EKG: normal sinus rhythm, nonspecific ST and T waves changes    No orders of the defined types were placed in this encounter. ASSESSMENT:     Diagnosis Orders   1. POTS (postural orthostatic tachycardia syndrome)        2. Vasovagal syncope              PLAN:         As always, aggressive risk factor modification is strongly recommended. We should adhere to the JNC VIII guidelines for HTN management and the NCEP ATP III guidelines for LDL-C management. Cardiac diet is always recommended with low fat, cholesterol, calories and sodium. Continue medications at current doses. No stress test warranted. Patient likely has a benign form of syncope vasovagal or pots disease. Recommend oral fluid hydration and, avoiding dehydration    [place] salt intake    Compression stockings    Consider beta-blockers, SSRIs, midodrine or Florinef in the future if symptoms progress or worsen    Consider tilt table testing in the future.

## 2022-11-14 ENCOUNTER — TELEPHONE (OUTPATIENT)
Dept: CARDIOLOGY CLINIC | Age: 42
End: 2022-11-14

## 2022-11-14 NOTE — TELEPHONE ENCOUNTER
I sent the order for compression stockings to [facility] in [place].  They usually have someone in the pharmacy that can measure the pt and get the right fit.   They should have the compression stockings in stock

## 2022-11-14 NOTE — TELEPHONE ENCOUNTER
Pt calling because [facility] will not take     Elastic Bandages & Supports (JOBST ACTIVE 20-30MMHG MEDIUM) MISC [[id number]]    Needs to go to a pharmacy that has medical supply instead.      Pt # [phone]

## 2022-12-07 ENCOUNTER — TELEPHONE (OUTPATIENT)
Dept: CARDIOLOGY CLINIC | Age: 42
End: 2022-12-07

## 2022-12-07 NOTE — TELEPHONE ENCOUNTER
[facility] CALLING  COMPRESSION STOCKING WERE ORDERED FOR PATIENT BUT THE DIAGNOSIS   FOR VASOVAGAL  SYNCOPE  AND POTS  ARE NOT COVERED DIAGNOSIS  FOR THESE.      PLEASE ADVISE

## 2023-01-11 ENCOUNTER — TELEPHONE (OUTPATIENT)
Dept: CARDIOLOGY CLINIC | Age: 43
End: 2023-01-11

## 2023-03-17 ENCOUNTER — OFFICE VISIT (OUTPATIENT)
Dept: FAMILY MEDICINE CLINIC | Age: 43
End: 2023-03-17
Payer: COMMERCIAL

## 2023-03-17 VITALS
SYSTOLIC BLOOD PRESSURE: 120 MMHG | BODY MASS INDEX: 27.32 KG/M2 | WEIGHT: 170 LBS | HEART RATE: 73 BPM | DIASTOLIC BLOOD PRESSURE: 80 MMHG | OXYGEN SATURATION: 99 % | TEMPERATURE: 96.8 F | HEIGHT: 66 IN

## 2023-03-17 DIAGNOSIS — M25.562 ACUTE PAIN OF LEFT KNEE: Primary | ICD-10-CM

## 2023-03-17 PROCEDURE — G8427 DOCREV CUR MEDS BY ELIG CLIN: HCPCS | Performed by: NURSE PRACTITIONER

## 2023-03-17 PROCEDURE — G8419 CALC BMI OUT NRM PARAM NOF/U: HCPCS | Performed by: NURSE PRACTITIONER

## 2023-03-17 PROCEDURE — 99213 OFFICE O/P EST LOW 20 MIN: CPT | Performed by: NURSE PRACTITIONER

## 2023-03-17 PROCEDURE — G8482 FLU IMMUNIZE ORDER/ADMIN: HCPCS | Performed by: NURSE PRACTITIONER

## 2023-03-17 PROCEDURE — 1036F TOBACCO NON-USER: CPT | Performed by: NURSE PRACTITIONER

## 2023-03-17 RX ORDER — FLUDROCORTISONE ACETATE 0.1 MG/1
TABLET ORAL
COMMUNITY
Start: 2023-01-28

## 2023-03-17 RX ORDER — CELECOXIB 200 MG/1
CAPSULE ORAL
COMMUNITY
Start: 2023-03-03

## 2023-03-17 RX ORDER — METHYLPREDNISOLONE 4 MG/1
TABLET ORAL
Qty: 1 TABLET | Refills: 0 | Status: SHIPPED | OUTPATIENT
Start: 2023-03-17 | End: 2023-03-23

## 2023-03-17 SDOH — ECONOMIC STABILITY: INCOME INSECURITY: HOW HARD IS IT FOR YOU TO PAY FOR THE VERY BASICS LIKE FOOD, HOUSING, MEDICAL CARE, AND HEATING?: NOT HARD AT ALL

## 2023-03-17 SDOH — ECONOMIC STABILITY: FOOD INSECURITY: WITHIN THE PAST 12 MONTHS, YOU WORRIED THAT YOUR FOOD WOULD RUN OUT BEFORE YOU GOT MONEY TO BUY MORE.: NEVER TRUE

## 2023-03-17 SDOH — ECONOMIC STABILITY: HOUSING INSECURITY
IN THE LAST 12 MONTHS, WAS THERE A TIME WHEN YOU DID NOT HAVE A STEADY PLACE TO SLEEP OR SLEPT IN A SHELTER (INCLUDING NOW)?: NO

## 2023-03-17 SDOH — ECONOMIC STABILITY: FOOD INSECURITY: WITHIN THE PAST 12 MONTHS, THE FOOD YOU BOUGHT JUST DIDN'T LAST AND YOU DIDN'T HAVE MONEY TO GET MORE.: NEVER TRUE

## 2023-03-17 ASSESSMENT — PATIENT HEALTH QUESTIONNAIRE - PHQ9
SUM OF ALL RESPONSES TO PHQ QUESTIONS 1-9: 0
2. FEELING DOWN, DEPRESSED OR HOPELESS: 0
7. TROUBLE CONCENTRATING ON THINGS, SUCH AS READING THE NEWSPAPER OR WATCHING TELEVISION: 0
4. FEELING TIRED OR HAVING LITTLE ENERGY: 0
10. IF YOU CHECKED OFF ANY PROBLEMS, HOW DIFFICULT HAVE THESE PROBLEMS MADE IT FOR YOU TO DO YOUR WORK, TAKE CARE OF THINGS AT HOME, OR GET ALONG WITH OTHER PEOPLE: 0
1. LITTLE INTEREST OR PLEASURE IN DOING THINGS: 0
3. TROUBLE FALLING OR STAYING ASLEEP: 0
SUM OF ALL RESPONSES TO PHQ QUESTIONS 1-9: 0
SUM OF ALL RESPONSES TO PHQ9 QUESTIONS 1 & 2: 0
6. FEELING BAD ABOUT YOURSELF - OR THAT YOU ARE A FAILURE OR HAVE LET YOURSELF OR YOUR FAMILY DOWN: 0
9. THOUGHTS THAT YOU WOULD BE BETTER OFF DEAD, OR OF HURTING YOURSELF: 0
5. POOR APPETITE OR OVEREATING: 0
SUM OF ALL RESPONSES TO PHQ QUESTIONS 1-9: 0
8. MOVING OR SPEAKING SO SLOWLY THAT OTHER PEOPLE COULD HAVE NOTICED. OR THE OPPOSITE, BEING SO FIGETY OR RESTLESS THAT YOU HAVE BEEN MOVING AROUND A LOT MORE THAN USUAL: 0
SUM OF ALL RESPONSES TO PHQ QUESTIONS 1-9: 0

## 2023-03-17 NOTE — PROGRESS NOTES
Subjective:      Patient ID: Deejay Rangel is a 36 y.o. female who presents today for:  Chief Complaint   Patient presents with    Knee Pain     Stabbing & burning knee pain, patient limping into room, limited flexion and extension on the left distal knee, x5days tx: heat,cold        HPI  Patient is here with c/o left knee pain to the outside of knee for the last 4-5 days. Says she feels it is worsening each day. Says she was kneeling and put weight on knee Monday morning and felt immidiate stabbing pain. No injury that she is aware of. Reports at times she feels a squeezing and it is painful with movement and to touch. Says she can flex and extend but is very painful, worse with flexion. Says she has not noticed any swelling or redness to the knee. Denies any fever or chills. Says she has no HX autoimmune disease. Says she has fibromyalgia, but does not feel the same. Says she is able to put weight on it, just painful. Says she is using heating pad, ice, and taking her normal medications (celebrex and neurontin). Taking this daily and says nothing is touching the pain or helping with the pain at all. She has been unable to work. Patient is wearing compression stockings for POTS at this time as well.    Past Medical History:   Diagnosis Date    Allergic rhinitis     Asthma     Chronic ear infection     Depressed     Double, Major and Dystemic    Fainting     since age 14    Fibromyalgia     Headache     POTS (postural orthostatic tachycardia syndrome) 11/11/2022    Scoliosis     Vasovagal syncope 11/11/2022    Vertigo     Vision loss     poor perpherial vision, doesn't drive     Past Surgical History:   Procedure Laterality Date    COLONOSCOPY N/A 10/21/2021    COLONOSCOPY WITH POLYPECTOMY performed by Karyn Robins MD at [address] [facility] History     Socioeconomic History    Marital status:      Spouse name: Not on file    Number of children: Not on file    Years of education: Not on file    Highest education level: Not on file   Occupational History    Not on file   Tobacco Use    Smoking status: Never    Smokeless tobacco: Never   Vaping Use    Vaping Use: Never used   Substance and Sexual Activity    Alcohol use: Yes     Comment: rarely    Drug use: No    Sexual activity: Not on file   Other Topics Concern    Not on file   Social History Narrative    Not on file     Social Determinants of Health     Financial Resource Strain: Low Risk     Difficulty of Paying Living Expenses: Not hard at all   Food Insecurity: No Food Insecurity    Worried About [address] in the Last Year: Never true    [address]  [email or web address] in the Last Year: Never true   Transportation Needs: Unknown    Lack of Transportation (Medical): Not on file    Lack of Transportation (Non-Medical):  No   Physical Activity: Not on file   Stress: Not on file   Social Connections: Not on file   Intimate Partner Violence: Not on file   Housing Stability: Unknown    Unable to Pay for Housing in the Last Year: Not on file    Number of Places Lived in the Last Year: Not on file    Unstable Housing in the Last Year: No     Family History   Problem Relation Age of Onset    Diabetes Mother 15    Arthritis Mother     Kidney Disease Mother         stage 3    Heart Disease Mother         CAD    Other Mother         Neurophathy, eye problems    Clotting Disorder Mother         Brain    Asthma Father     Diabetes Sister         due to mom DM    Alcohol Abuse Neg Hx     Allergy (Severe) Neg Hx     Anemia Neg Hx     Arrhythmia Neg Hx     Atrial Fibrillation Neg Hx     Birth Defects Neg Hx     Breast Cancer Neg Hx     Cancer Neg Hx     Coronary Art Dis Neg Hx     Colon Cancer Neg Hx     Depression Neg Hx     Early Death Neg Hx     Heart Attack Neg Hx     Hearing Loss Neg Hx     High Blood Pressure Neg Hx     High Cholesterol Neg Hx     Learning Disabilities Neg Hx     Mental Illness Neg Hx     Mental Retardation Neg Hx Miscarriages / Stillbirths Neg Hx     Obesity Neg Hx     Osteoporosis Neg Hx     Prostate Cancer Neg Hx     Stroke Neg Hx     Substance Abuse Neg Hx     Vision Loss Neg Hx      Allergies   Allergen Reactions    Latex Hives    Bismuth Subsalicylate Hives    Antibacterial Hand Soap [Triclosan] Hives    Kiwi Extract Hives    Strawberry Extract Hives    Viibryd [Vilazodone Hcl] Hives    Effexor [Venlafaxine] Rash     Current Outpatient Medications   Medication Sig Dispense Refill    celecoxib (CELEBREX) 200 MG capsule TAKE 1 CAPSULE BY MOUTH 2 TIMES A DAY AFTER MEALS      fludrocortisone (FLORINEF) 0.1 MG tablet TAKE 1 TABLET BY MOUTH AT BEDTIME      methylPREDNISolone (MEDROL DOSEPACK) 4 MG tablet Take by mouth. 1 tablet 0    Elastic Bandages & Supports (JOBST ACTIVE 20-30MMHG MEDIUM) MISC Thigh-high compression stockings, 20 to 30 mmHg 1 each 3    gabapentin (NEURONTIN) 300 MG capsule TAKE 1 CAPSULE BY MOUTH AT BEDTIME      prazosin (MINIPRESS) 1 MG capsule TAKE 1 CAPSULE BY MOUTH AT BEDTIME      traZODone (DESYREL) 100 MG tablet TAKE 1 TABLET BY MOUTH AT BEDTIME AS NEEDED FOR SLEEP      TRINTELLIX 10 MG TABS tablet TAKE 1 TABLET BY MOUTH EVERY DAY      albuterol sulfate HFA (VENTOLIN HFA) 108 (90 Base) MCG/ACT inhaler Inhale 2 puffs into the lungs every 6 hours as needed for Wheezing or Shortness of Breath 2 each 3    albuterol (PROVENTIL) (2.5 MG/3ML) 0.083% nebulizer solution Take 3 mLs by nebulization every 6 hours as needed for Wheezing 120 each 3    Multiple Vitamins-Minerals (MULTIVITAMIN ADULTS PO) Take by mouth daily      vitamin D (CHOLECALCIFEROL) 1000 UNIT TABS tablet Take 1,000 Units by mouth daily       No current facility-administered medications for this visit. Review of Systems   Constitutional:  Positive for activity change. Appetite change: left knee pian. Musculoskeletal:  Positive for arthralgias (left knee pain) and gait problem. Negative for joint swelling.      Objective:   /80  Pulse 73   Temp 96.8 °F (36 °C) (Temporal)   Ht 5' 6\" (1.676 m) Comment: per per  Wt 170 lb (77.1 kg) Comment: per pt  LMP 03/03/2023 (Approximate)   SpO2 99%   BMI 27.44 kg/m²     Physical Exam  Vitals reviewed.   Constitutional:       General: She is awake. She is not in acute distress.     Appearance: Normal appearance. She is well-developed, well-groomed and normal weight. She is not ill-appearing, toxic-appearing or diaphoretic.   HENT:      Head: Normocephalic and atraumatic.      Right Ear: Hearing normal.      Left Ear: Hearing normal.      Mouth/Throat:      Lips: Pink.   Eyes:      General: Lids are normal.   Neck:      Trachea: Trachea normal.   Cardiovascular:      Rate and Rhythm: Normal rate and regular rhythm.      Pulses: Normal pulses.      Heart sounds: Normal heart sounds, S1 normal and S2 normal.   Pulmonary:      Effort: Pulmonary effort is normal.      Breath sounds: Normal breath sounds and air entry.   Musculoskeletal:         General: Tenderness present. No swelling, deformity or signs of injury.      Cervical back: Normal range of motion and neck supple.      Right lower leg: No edema.      Left lower leg: Tenderness present. No swelling, deformity or lacerations. No edema.        Legs:       Comments: Left knee pain with light touch. Pain with palpation of the left lateral patella and meniscal area. No swelling or discoloration. Limited ROM and exam d/t degree of pain. Full active ROM noted but with pain. No crepitus seen or felt.   Skin:     General: Skin is warm and dry.      Capillary Refill: Capillary refill takes less than 2 seconds.   Neurological:      General: No focal deficit present.      Mental Status: She is alert and oriented to person, place, and time. Mental status is at baseline.   Psychiatric:         Attention and Perception: Attention and perception normal.         Mood and Affect: Mood and affect normal.         Speech: Speech normal.         Behavior: Behavior  normal. Behavior is cooperative. Thought Content: Thought content normal.         Cognition and Memory: Cognition and memory normal.         Judgment: Judgment normal.       Assessment:       Diagnosis Orders   1. Acute pain of left knee  [facility] Lari Bosworth MD, Sports Medicine, [place]    XR KNEE LEFT (MIN 4 VIEWS)        No results found for this visit on 03/17/23. Plan:     Assessment & Plan   Karen Jackson was seen today for knee pain. Diagnoses and all orders for this visit:    Acute pain of left knee  -     [facility]  -     XR KNEE LEFT (MIN 4 VIEWS); Future    Other orders  -     methylPREDNISolone (MEDROL DOSEPACK) 4 MG tablet; Take by mouth. Discussed with patient will treat with steroid for inflammation and should cont ice and rest. Will get XR and call with results ASAP. Advised patient will also refer to Sports med for further evaluation and management if the symptoms do not improve with steroid, ice, and rest.   Discussed administration and SE of the medication. Orders Placed This Encounter   Procedures    XR KNEE LEFT (MIN 4 VIEWS)     Standing Status:   Future     Number of Occurrences:   1     Standing Expiration Date:   3/17/2024     Order Specific Question:   Reason for exam:     Answer:   Left knee pain for the lat 5 days, worsening despite treatment. Giacomo Holland MD, Sports Medicine, [place]     Referral Priority:   Routine     Referral Type:   Eval and Treat     Referral Reason:   Specialty Services Required     Referred to Provider:   Yazan Stover DO     Requested Specialty:   Internal Medicine Sports Medicine     Number of Visits Requested:   1     Orders Placed This Encounter   Medications    methylPREDNISolone (MEDROL DOSEPACK) 4 MG tablet     Sig: Take by mouth.      Dispense:  1 tablet     Refill:  0     Medications Discontinued During This Encounter   Medication Reason    gabapentin (NEURONTIN) 100 MG capsule DUPLICATE    naproxen (NAPROSYN) 500 MG tablet LIST CLEANUP    baclofen (LIORESAL) 10 MG tablet LIST CLEANUP     Return for Referral made for sport med if symptoms do not improve. .        Reviewed with the patient/family: current clinical status & medications.  Side effects of the medication prescribed today, as well as treatment plan/rationale and result expectations have been discussed with the patient/family who expresses understanding. Patient will be discharged home in stable condition.    Follow up with PCP to evaluate treatment results or return if symptoms worsen or fail to improve. Discussed signs and symptoms which require immediate follow-up in ED/call to 911.  Understanding verbalized.     I have reviewed the patient's medical history in detail and updated the computerized patient record.    PATTIE RAYMOND, GREGORY - CNP

## 2023-03-31 ENCOUNTER — INITIAL CONSULT (OUTPATIENT)
Dept: SPORTS MEDICINE | Age: 43
End: 2023-03-31
Payer: COMMERCIAL

## 2023-03-31 VITALS
HEIGHT: 67 IN | DIASTOLIC BLOOD PRESSURE: 78 MMHG | BODY MASS INDEX: 26.68 KG/M2 | SYSTOLIC BLOOD PRESSURE: 114 MMHG | TEMPERATURE: 97.7 F | WEIGHT: 170 LBS

## 2023-03-31 DIAGNOSIS — S86.812A STRAIN OF CALF MUSCLE, LEFT, INITIAL ENCOUNTER: Primary | ICD-10-CM

## 2023-03-31 PROCEDURE — 99204 OFFICE O/P NEW MOD 45 MIN: CPT | Performed by: FAMILY MEDICINE

## 2023-03-31 PROCEDURE — L1830 KO IMMOB CANVAS LONG PRE OTS: HCPCS | Performed by: FAMILY MEDICINE

## 2023-03-31 PROCEDURE — G8427 DOCREV CUR MEDS BY ELIG CLIN: HCPCS | Performed by: FAMILY MEDICINE

## 2023-03-31 PROCEDURE — G8482 FLU IMMUNIZE ORDER/ADMIN: HCPCS | Performed by: FAMILY MEDICINE

## 2023-03-31 PROCEDURE — 1036F TOBACCO NON-USER: CPT | Performed by: FAMILY MEDICINE

## 2023-03-31 PROCEDURE — G8419 CALC BMI OUT NRM PARAM NOF/U: HCPCS | Performed by: FAMILY MEDICINE

## 2023-03-31 ASSESSMENT — ENCOUNTER SYMPTOMS
BACK PAIN: 0
SHORTNESS OF BREATH: 0
NAUSEA: 0
CONSTIPATION: 0
DIARRHEA: 0

## 2023-03-31 NOTE — PROGRESS NOTES
Patient was prescribed a Breg Knee Immobilizer. The left knee will require stabilization / immobilization from this semi-rigid / rigid orthosis to improve their function. The orthosis will assist in protecting the affected area, provide functional support and facilitate healing. The prefabricated orthosis was modified in the following manner to provide a customizable fit for the patient at the time of delivery. 1. Identification of appropriate positioning and alignment of anatomical landmarks. 2. Trimming of straps and panels. Reassemble orthosis to specifically fit patient. The patient was educated and fit by a healthcare professional with expert knowledge and specialization in brace application while under the direct supervision of the treating physician. Verbal and written instructions for the use of and application of this item were provided. They were instructed to contact the office immediately should the brace result in increased pain, decreased sensation, increased swelling or worsening of the condition.
4/14/2023).     AMY MCCURDY, DO

## 2023-04-24 ENCOUNTER — TELEPHONE (OUTPATIENT)
Dept: PRIMARY CARE CLINIC | Age: 43
End: 2023-04-24

## 2023-04-25 ENCOUNTER — HOSPITAL ENCOUNTER (OUTPATIENT)
Dept: PHYSICAL THERAPY | Age: 43
Setting detail: THERAPIES SERIES
Discharge: HOME | End: 2023-04-25
Payer: COMMERCIAL

## 2023-04-25 DIAGNOSIS — J45.41 MODERATE PERSISTENT ASTHMA WITH ACUTE EXACERBATION: ICD-10-CM

## 2023-04-25 PROCEDURE — 97161 PT EVAL LOW COMPLEX 20 MIN: CPT

## 2023-04-25 PROCEDURE — 97110 THERAPEUTIC EXERCISES: CPT

## 2023-04-25 RX ORDER — ALBUTEROL SULFATE 90 UG/1
2 AEROSOL, METERED RESPIRATORY (INHALATION) EVERY 6 HOURS PRN
Qty: 2 EACH | Refills: 3 | Status: SHIPPED | OUTPATIENT
Start: 2023-04-25

## 2023-04-25 ASSESSMENT — PAIN DESCRIPTION - ORIENTATION: ORIENTATION: LEFT

## 2023-04-25 ASSESSMENT — PAIN DESCRIPTION - PAIN TYPE: TYPE: ACUTE PAIN

## 2023-04-25 ASSESSMENT — PAIN DESCRIPTION - LOCATION: LOCATION: KNEE;LEG

## 2023-04-25 ASSESSMENT — PAIN SCALES - GENERAL: PAINLEVEL_OUTOF10: 1

## 2023-04-25 ASSESSMENT — PAIN DESCRIPTION - DESCRIPTORS: DESCRIPTORS: BURNING;STABBING

## 2023-04-25 NOTE — PROGRESS NOTES
[facility] and Therapy  PHYSICAL THERAPY EVALUATION    Physical Therapy: Initial Evaluation    Patient: Anastasia Nagy (55 y.o.     female)   Examination Date: 2023   :  1980 ;    Confirmed: Yes MRN: [id number]  CSN: [id number]   Insurance: Payor: [address] [place] [address] [address] / Plan: [address] [place] [address] [address] / Product Type: *No Product type* /   Insurance ID: [id number] - (Medicaid Managed) Secondary Insurance (if applicable):    Referring Physician: Romina Peña DO       Visits to Date/Visits Approved:       No Show/Cancelled Appts:   /       Medical Diagnosis: Strain of calf muscle, left, subsequent encounter [P88.448P]        Treatment Diagnosis: decreased L knee ROM, decreased L Leg strength, difficulty with ambulation, decreased stair negotiation, increased pain     PERTINENT MEDICAL HISTORY   Patient Assessed for Rehabilitation Services: Yes       Medical History: Chart Reviewed: Yes   Past Medical History:   Diagnosis Date    Allergic rhinitis     Asthma     Chronic ear infection     Depressed     Double, Major and Dystemic    Fainting     since age 14    Fibromyalgia     Headache     POTS (postural orthostatic tachycardia syndrome) 2022    Scoliosis     Vasovagal syncope 2022    Vertigo     Vision loss     poor perpherial vision, doesn't drive     Surgical History:   Past Surgical History:   Procedure Laterality Date    COLONOSCOPY N/A 10/21/2021    COLONOSCOPY WITH POLYPECTOMY performed by Mohamud Aguilera MD at [address] [place] [address]         Medications:   Current Outpatient Medications:     celecoxib (CELEBREX) 200 MG capsule, TAKE 1 CAPSULE BY MOUTH 2 TIMES A DAY AFTER MEALS, Disp: , Rfl:     fludrocortisone (FLORINEF) 0.1 MG tablet, TAKE 1 TABLET BY MOUTH AT BEDTIME, Disp: , Rfl:     Elastic Bandages & Supports (JOBST ACTIVE 20-30MMHG MEDIUM) MISC, Thigh-high compression stockings, 20 to 30 mmHg, Disp: 1

## 2023-04-25 NOTE — PROGRESS NOTES
[address]  [address]     Ph: [phone]  Fax: [phone]    [] Certification  [] Recertification [x]  Plan of Care  [] Progress Note [] Discharge      Referring Provider: Nisha Lynch DO    From:  Nilda Cruz, PT,DPT  Patient: Ezequiel Denver (17 y.o. female) : 1980 Date: 2023   Medical Diagnosis: Strain of calf muscle, left, subsequent encounter [S86.023F]    Treatment Diagnosis: decreased L knee ROM, decreased L Leg strength, difficulty with ambulation, decreased stair negotiation, increased pain    Plan of Care/Certification Expiration Date:     Progress Report Period from: 2023  to 2023    Visits to Date:   No Show:   Cancelled Appts:      OBJECTIVE:   Short Term Goals - Time Frame for Short Term Goals: 2-3 weeks    Goals Current/Discharge status  Status   Short Term Goal 1: patient will be indep with HEP to self-assess and manage s/s upon d/c from skilled PT  Administered HEP this date New   Short Term Goal 2: patient will be able to complete x10 L SLS heel raises to demonstrate improvements in calf strength for improved push-off during gait  Strength LLE  Comment: increased difficulty completing SLS heel raise, through limited ROM  Strength RLE  Comment: able to complete x5 SLS heel raises through full  New     Long Term Goals - Time Frame for Long Term Goals : 5 weeks  Goals Current/ Discharge status Status   Long Term Goal 1: patient will improve L knee ROM to 0-125 deg to improve gait mechanics and stair negotation AROM LLE (degrees)  L Knee Flexion (0-145): 85  L Knee Extension (0): -6   AROM RLE (degrees)  R Knee Flexion (0-145): 140  R Knee Extension (0): 0  New   Long Term Goal 2: patient will ambulate >/=500' with normalized gait pattern to allow for improved indep in the home and community Ambulation  Surface: Carpet  Device: No Device  Assistance:

## 2023-05-03 ENCOUNTER — HOSPITAL ENCOUNTER (OUTPATIENT)
Dept: PHYSICAL THERAPY | Age: 43
Setting detail: THERAPIES SERIES
Discharge: HOME | End: 2023-05-03
Payer: COMMERCIAL

## 2023-05-03 PROCEDURE — 97110 THERAPEUTIC EXERCISES: CPT

## 2023-05-03 ASSESSMENT — PAIN DESCRIPTION - LOCATION: LOCATION: KNEE;LEG

## 2023-05-03 ASSESSMENT — PAIN SCALES - GENERAL: PAINLEVEL_OUTOF10: 1

## 2023-05-03 ASSESSMENT — PAIN DESCRIPTION - PAIN TYPE: TYPE: ACUTE PAIN

## 2023-05-03 ASSESSMENT — PAIN DESCRIPTION - ORIENTATION: ORIENTATION: LEFT

## 2023-05-03 NOTE — PROGRESS NOTES
[facility]  Outpatient Physical Therapy    Treatment Note        Date: 5/3/2023  Patient: Jace He  : 1980   Confirmed: Yes  MRN: [id number]  Referring Provider: Joseph Colvin DO    Medical Diagnosis: Strain of calf muscle, left, subsequent encounter [R19.536B]       Treatment Diagnosis: decreased L knee ROM, decreased L Leg strength, difficulty with ambulation, decreased stair negotiation, increased pain    Visit Information:  Insurance: Payor: [address] [place] [address]  [address] / Plan: [address] [place] [address] [address] / Product Type: *No Product type* /   PT Visit Information  PT Insurance Information: [facility]  Total # of Visits Approved: 30  Total # of Visits to Date: 2  Progress Note Counter:     Subjective Information:  Subjective: Pt reports she is really sore from being on her feet all day at work. Reports she hasnt use cane recently. RTD   HEP Compliance:  [x] Good [] Fair [] Poor [] Reports not doing due to:    Pain Screening  Patient Currently in Pain: Yes  Pain Assessment: 0-10  Pain Level: 1  Pain Type: Acute pain  Pain Location: Knee, Leg  Pain Orientation: Left    Treatment:  Exercises:  Exercises  Exercise 1: strap assisted heel slides 15 reps x 5 sec holds  Exercise 2: strap assisted calf str 5 reps x 20 sec holds  Exercise 3: quad sets 10 reps x 5 sec holds- unable to reach full TKE dt pain  Exercise 4: seated heel raises x20  Exercise 5: bosu lunges*  Exercise 6: [employer]Fit L1.0 3 mins- not able to tolerate 5 mins  Exercise 7: LAQ x15 5'hold  Exercise 8: hamstring stretch on block 3x20'  Exercise 9: sidely-clamshells x10  Exercise 10: bridges x10 5' hold  Exercise 11: rockerboard medial/lateral taps x20  Exercise 20: HEP: heel slides, calf str, quad set       Assessment:    Body Structures, Functions, Activity Limitations Requiring Skilled Therapeutic Intervention: Decreased functional mobility , Decreased ROM, Decreased body mechanics, Decreased

## 2023-05-05 ENCOUNTER — OFFICE VISIT (OUTPATIENT)
Dept: SPORTS MEDICINE | Age: 43
End: 2023-05-05

## 2023-05-05 VITALS
HEIGHT: 66 IN | SYSTOLIC BLOOD PRESSURE: 116 MMHG | TEMPERATURE: 97.8 F | WEIGHT: 170 LBS | BODY MASS INDEX: 27.32 KG/M2 | DIASTOLIC BLOOD PRESSURE: 80 MMHG

## 2023-05-05 DIAGNOSIS — M23.92 INTERNAL DERANGEMENT OF LEFT KNEE: Primary | ICD-10-CM

## 2023-05-05 ASSESSMENT — ENCOUNTER SYMPTOMS
DIARRHEA: 0
BACK PAIN: 0
NAUSEA: 0
CONSTIPATION: 0
SHORTNESS OF BREATH: 0

## 2023-05-05 NOTE — PROGRESS NOTES
[address]  [facility]  Neurosurgery and [facility]  [address] [facility], [address] [facility] , [address] [place], [place]  P: [phone]  F: [phone]      Elli Mckenna  (1980)    5/5/2023    Subjective:     Elli Mckenna is 36 y.o. female who complains today of:    Chief Complaint   Patient presents with    Knee Pain       HPI     This patient returns stating that she is still having significant left lateral knee pain says she switch from immobilizer to a hinged knee brace and although her pain is a little bit better she still has significant pain especially by the end of the night and hurts her during work she has been going to physical therapy and has not seem to help her  Allergies:  Latex, Bismuth subsalicylate, Antibacterial hand soap [triclosan], Kiwi extract, Other, Strawberry extract, Viibryd [vilazodone hcl], and Effexor [venlafaxine]    Past Medical History:   Diagnosis Date    Allergic rhinitis     Asthma     Chronic ear infection     Depressed     Double, Major and Dystemic    Fainting     since age 14    Fibromyalgia     Headache     POTS (postural orthostatic tachycardia syndrome) 11/11/2022    Scoliosis     Vasovagal syncope 11/11/2022    Vertigo     Vision loss     poor perpherial vision, doesn't drive     Past Surgical History:   Procedure Laterality Date    COLONOSCOPY N/A 10/21/2021    COLONOSCOPY WITH POLYPECTOMY performed by Juan Avilez MD at [address] [place]       Family History   Problem Relation Age of Onset    Diabetes Mother 15    Arthritis Mother     Kidney Disease Mother         stage 3    Heart Disease Mother         CAD    Other Mother         Neurophathy, eye problems    Clotting Disorder Mother         Brain    Asthma Father     Diabetes Sister         due to mom DM    Alcohol Abuse Neg Hx     Allergy (Severe) Neg Hx     Anemia Neg Hx     Arrhythmia Neg Hx     Atrial Fibrillation Neg Hx     Birth Defects Neg Hx     Breast Cancer Neg Hx

## 2023-05-10 ENCOUNTER — HOSPITAL ENCOUNTER (OUTPATIENT)
Dept: PHYSICAL THERAPY | Age: 43
Setting detail: THERAPIES SERIES
Discharge: HOME | End: 2023-05-10
Payer: COMMERCIAL

## 2023-05-10 PROCEDURE — 97110 THERAPEUTIC EXERCISES: CPT

## 2023-05-10 ASSESSMENT — PAIN DESCRIPTION - LOCATION: LOCATION: KNEE;LEG

## 2023-05-10 ASSESSMENT — PAIN DESCRIPTION - PAIN TYPE: TYPE: ACUTE PAIN

## 2023-05-10 ASSESSMENT — PAIN SCALES - GENERAL: PAINLEVEL_OUTOF10: 1

## 2023-05-10 NOTE — PROGRESS NOTES
[address]       Physical Therapy  Cancellation/No-show Note  Patient Name:  Seymour Clark  :  1980   Date:  5/10/2023    Visit Information:  PT Visit Information  PT Insurance Information: [facility]  Total # of Visits Approved: 30  Total # of Visits to Date: 2  No Show: 0  Canceled Appointment: 1  Progress Note Counter:  *cxl 5/10    For today's appointment patient:  [x]  Cancelled  []  Rescheduled appointment  []  No-show   []  Called pt to remind of next appointment     Reason given by patient:  []  Patient ill  []  Conflicting appointment  []  No transportation    []  Conflict with work  []  No reason given  [x]  Other:  Car broke down    Signature: Electronically signed by Fauzia Calderon PTA on 5/10/23 at 4:09 PM EDT

## 2023-05-10 NOTE — PROGRESS NOTES
related activity, Home exercise program, Safety education & training, Patient/Caregiver education & training, Stair training, Modalities  Modalities: Heat/Cold  Pt to continue current HEP. See objective section for any therapeutic exercise changes, additions or modifications this date.     Therapy Time:   PT Individual Minutes  Time In: 1399  Time Out: 4204  Minutes: 38  Timed Code Treatment Minutes: 38 Minutes    Procedure Minutes:0  Timed Activity Minutes Units   Ther Ex 39 3     Electronically signed by Kimberly Mckinney PTA on 5/10/23 at 4:26 PM EDT

## 2023-05-11 ENCOUNTER — OFFICE VISIT (OUTPATIENT)
Dept: PRIMARY CARE CLINIC | Age: 43
End: 2023-05-11
Payer: COMMERCIAL

## 2023-05-11 VITALS
SYSTOLIC BLOOD PRESSURE: 122 MMHG | HEART RATE: 78 BPM | TEMPERATURE: 98 F | OXYGEN SATURATION: 100 % | HEIGHT: 66 IN | BODY MASS INDEX: 28.51 KG/M2 | DIASTOLIC BLOOD PRESSURE: 66 MMHG | RESPIRATION RATE: 18 BRPM | WEIGHT: 177.4 LBS

## 2023-05-11 DIAGNOSIS — G90.A POTS (POSTURAL ORTHOSTATIC TACHYCARDIA SYNDROME): Primary | ICD-10-CM

## 2023-05-11 PROCEDURE — 99213 OFFICE O/P EST LOW 20 MIN: CPT | Performed by: INTERNAL MEDICINE

## 2023-05-11 PROCEDURE — 1036F TOBACCO NON-USER: CPT | Performed by: INTERNAL MEDICINE

## 2023-05-11 PROCEDURE — G8419 CALC BMI OUT NRM PARAM NOF/U: HCPCS | Performed by: INTERNAL MEDICINE

## 2023-05-11 PROCEDURE — G8427 DOCREV CUR MEDS BY ELIG CLIN: HCPCS | Performed by: INTERNAL MEDICINE

## 2023-05-11 RX ORDER — ARIPIPRAZOLE 2 MG/1
2 TABLET ORAL DAILY
COMMUNITY
Start: 2023-05-01

## 2023-05-11 RX ORDER — GLUCOSAMINE/D3/BOSWELLIA SERRA 1500MG-400
TABLET ORAL
COMMUNITY
Start: 2022-11-28

## 2023-05-11 ASSESSMENT — ENCOUNTER SYMPTOMS
SHORTNESS OF BREATH: 0
COUGH: 0
ABDOMINAL PAIN: 0
WHEEZING: 0
DIARRHEA: 0
NAUSEA: 0
RHINORRHEA: 0
VOMITING: 0

## 2023-05-11 NOTE — PROGRESS NOTES
Subjective:      Patient ID: Viktoriya Manuel is a 36 y.o. female    Follow up   HPI  Pt presents for follow up regarding  POTS. Controlled better on Florinef. Seen by cardio and neuro. Also compliant with compression stockings and increase fluid and salt intake. Past Medical History:   Diagnosis Date    Allergic rhinitis     Asthma     Chronic ear infection     Depressed     Double, Major and Dystemic    Fainting     since age 14    Fibromyalgia     Headache     POTS (postural orthostatic tachycardia syndrome) 11/11/2022    Scoliosis     Vasovagal syncope 11/11/2022    Vertigo     Vision loss     poor perpherial vision, doesn't drive     Past Surgical History:   Procedure Laterality Date    COLONOSCOPY N/A 10/21/2021    COLONOSCOPY WITH POLYPECTOMY performed by Edwin Mazariegos MD at [address] History     Socioeconomic History    Marital status:      Spouse name: Not on file    Number of children: Not on file    Years of education: Not on file    Highest education level: Not on file   Occupational History    Not on file   Tobacco Use    Smoking status: Former     Types: Cigarettes    Smokeless tobacco: Never   Vaping Use    Vaping Use: Never used   Substance and Sexual Activity    Alcohol use: Yes     Comment: rarely    Drug use: No    Sexual activity: Not on file   Other Topics Concern    Not on file   Social History Narrative    Not on file     Social Determinants of Health     Financial Resource Strain: Low Risk     Difficulty of Paying Living Expenses: Not hard at all   Food Insecurity: No Food Insecurity    Worried About Running Out of Food in the Last Year: Never true    [address] in the Last Year: Never true   Transportation Needs: Unknown    Lack of Transportation (Medical): Not on file    Lack of Transportation (Non-Medical):  No   Physical Activity: Not on file   Stress: Not on file   Social Connections: Not on file   Intimate Partner Violence: Not on file

## 2023-05-17 ENCOUNTER — HOSPITAL ENCOUNTER (OUTPATIENT)
Dept: PHYSICAL THERAPY | Age: 43
Setting detail: THERAPIES SERIES
Discharge: HOME | End: 2023-05-17
Payer: COMMERCIAL

## 2023-05-17 PROCEDURE — 97110 THERAPEUTIC EXERCISES: CPT

## 2023-05-17 ASSESSMENT — PAIN DESCRIPTION - ORIENTATION: ORIENTATION: LEFT

## 2023-05-17 ASSESSMENT — PAIN DESCRIPTION - DESCRIPTORS: DESCRIPTORS: ACHING;NAGGING

## 2023-05-17 ASSESSMENT — PAIN SCALES - GENERAL: PAINLEVEL_OUTOF10: 2

## 2023-05-17 ASSESSMENT — PAIN DESCRIPTION - LOCATION: LOCATION: KNEE

## 2023-05-17 NOTE — PROGRESS NOTES
[facility]  Outpatient Physical Therapy   Treatment Note        Date: 2023  Patient: Jonatan Sales  : 1980   Confirmed: Yes  MRN: [id number]  Referring Provider: Abebe Villalobos DO      Medical Diagnosis: Strain of calf muscle, left, subsequent encounter [R98.797N]      Treatment Diagnosis: decreased L knee ROM, decreased L Leg strength, difficulty with ambulation, decreased stair negotiation, increased pain    Visit Information:  Insurance: Payor: [address] [place] [address]  [address] / Plan: [address] [place] [address] [address] / Product Type: *No Product type* /   PT Visit Information  PT Insurance Information: [facility]  Total # of Visits Approved: 30  Total # of Visits to Date: 2  No Show: 0  Canceled Appointment: 0  Progress Note Counter: 3/5    Subjective Information:  Subjective: Pt reported she is doing better than last week. Noted she lost her brace last week and has not found it yet, however she noted the pain has not increased. She has been icing the L knee more.   HEP Compliance:  [x] Good [] Fair [] Poor [] Reports not doing due to:               Pain Screening  Patient Currently in Pain: Yes  Pain Assessment: 0-10  Pain Level: 2  Pain Location: Knee  Pain Orientation: Left  Pain Descriptors: Aching, Nagging    Treatment:  Exercises:  Exercises  Exercise 1: strap assisted heel slides 20 reps x 5 sec holds  Exercise 2: strap assisted calf str 5 reps x 20 sec holds  Exercise 3: quad sets 15 reps x 5 sec holds  Exercise 4: seated heel raises x20- standing heel raises x10  Exercise 5: bosu lunges x10 - occasional UE support  Exercise 6: SciFit L1.5 6 mins  Exercise 7: LAQ x15 5'hold 2# ankle weight - slight discomfort the last 3 reps on lateral L knee  Exercise 8: hamstring stretch on block 3x20'  Exercise 9: sidely-clamshells x15 YTB  Exercise 10: bridges x10 5' hold YTB  Exercise 11: rockerboard medial/lateral taps x20  Exercise 12: lateral stepping x3 laps YTB -

## 2023-05-22 ENCOUNTER — HOSPITAL ENCOUNTER (OUTPATIENT)
Dept: MRI IMAGING | Age: 43
Discharge: HOME | End: 2023-05-24
Payer: COMMERCIAL

## 2023-05-22 DIAGNOSIS — M23.92 INTERNAL DERANGEMENT OF LEFT KNEE: ICD-10-CM

## 2023-05-22 PROCEDURE — 73721 MRI JNT OF LWR EXTRE W/O DYE: CPT

## 2023-05-24 ENCOUNTER — APPOINTMENT (OUTPATIENT)
Dept: PHYSICAL THERAPY | Age: 43
End: 2023-05-24
Payer: COMMERCIAL

## 2023-05-24 PROCEDURE — 97110 THERAPEUTIC EXERCISES: CPT

## 2023-05-24 ASSESSMENT — PAIN DESCRIPTION - LOCATION: LOCATION: KNEE

## 2023-05-24 ASSESSMENT — PAIN DESCRIPTION - PAIN TYPE: TYPE: ACUTE PAIN

## 2023-05-24 ASSESSMENT — PAIN DESCRIPTION - ORIENTATION: ORIENTATION: LEFT

## 2023-05-24 ASSESSMENT — PAIN SCALES - GENERAL: PAINLEVEL_OUTOF10: 1

## 2023-05-24 NOTE — PROGRESS NOTES
[facility]  Outpatient Physical Therapy    Treatment Note        Date: 2023  Patient: Benito Zapata  : 1980   Confirmed: Yes  MRN: [id number]  Referring Provider: Meliza Antonio DO    Medical Diagnosis: Strain of calf muscle, left, subsequent encounter [P74.990R]       Treatment Diagnosis: decreased L knee ROM, decreased L Leg strength, difficulty with ambulation, decreased stair negotiation, increased pain    Visit Information:  Insurance: Payor: [address] [place] [address]  [address] / Plan: [address] [place] [address] [address] / Product Type: *No Product type* /   PT Visit Information  PT Insurance Information: [facility]  Total # of Visits Approved: 30  Total # of Visits to Date: 5  No Show: 0  Canceled Appointment: 0  Progress Note Counter:  *corrected count    Subjective Information:  Subjective: Pt reports knee still hurts sometimes and has rubbing feeling.   HEP Compliance:  [x] Good [] Fair [] Poor [] Reports not doing due to:    Pain Screening  Patient Currently in Pain: Yes  Pain Assessment: 0-10  Pain Level: 1  Pain Type: Acute pain  Pain Location: Knee  Pain Orientation: Left    Treatment:  Exercises:  Exercises  Exercise 5: bosu lunges x15  Exercise 6: SciFit L2.5 5 mins  Exercise 7: LAQ RTB x10 bilat  Exercise 8: hamstring stretch on block 3x20'  Exercise 11: rockerboard medial/lateral taps x20  Exercise 12: lateral stepping x3 laps RTB  Exercise 20: HEP: LAQ, lateral stepping RTB     Objective Measures:   Ambulation  Surface: Carpet  Device: No Device  Assistance: Independent  Quality of Gait: antalgic, mild hip adduction during swing phase, decreased L LE Wbing  Distance: 500'    Strength: [] NT  [x] MMT completed:  Strength RLE  R Hip Flexion: 5/5  R Hip Extension: 5/5  R Hip ABduction: 5/5  R Knee Flexion: 5/5  R Knee Extension: 5/5  Strength LLE  Comment: able to complete 10 SLS heel raise, through full ROM  L Hip Flexion: 5/5  L Hip Extension: 4/5  L Hip
patient has concerns or questions to call. Patient appropriate for discharge this date. Therapy Prognosis: Good    PLAN: [x] Discharge                  Patient Status:[] Continue/ Initiate plan of Care     [x] Discharge PT. Recommend pt continue with HEP. [] Additional visits requested, Please re-certify for additional visits:     [] Hold     Signature:  Objective measures collected by: Electronically signed by Sugey Crowell PTA on 5/24/23 at 4:27 PM EDT  Electronically signed by Pili Richter PT on 5/26/2023 at 1:40 PM        If you have any questions or concerns, please don't hesitate to call.   Thank you for your referral.

## 2023-05-26 ENCOUNTER — OFFICE VISIT (OUTPATIENT)
Dept: SPORTS MEDICINE | Age: 43
End: 2023-05-26
Payer: COMMERCIAL

## 2023-05-26 VITALS
SYSTOLIC BLOOD PRESSURE: 124 MMHG | HEIGHT: 66 IN | WEIGHT: 175 LBS | DIASTOLIC BLOOD PRESSURE: 80 MMHG | TEMPERATURE: 97.8 F | BODY MASS INDEX: 28.12 KG/M2

## 2023-05-26 DIAGNOSIS — S86.812D STRAIN OF CALF MUSCLE, LEFT, SUBSEQUENT ENCOUNTER: Primary | ICD-10-CM

## 2023-05-26 PROCEDURE — G8427 DOCREV CUR MEDS BY ELIG CLIN: HCPCS | Performed by: FAMILY MEDICINE

## 2023-05-26 PROCEDURE — 99213 OFFICE O/P EST LOW 20 MIN: CPT | Performed by: FAMILY MEDICINE

## 2023-05-26 PROCEDURE — G8419 CALC BMI OUT NRM PARAM NOF/U: HCPCS | Performed by: FAMILY MEDICINE

## 2023-05-26 PROCEDURE — 1036F TOBACCO NON-USER: CPT | Performed by: FAMILY MEDICINE

## 2023-05-26 ASSESSMENT — ENCOUNTER SYMPTOMS
NAUSEA: 0
BACK PAIN: 0
CONSTIPATION: 0
DIARRHEA: 0
SHORTNESS OF BREATH: 0

## 2023-05-26 NOTE — PROGRESS NOTES
[facility]  Neurosurgery and Pain [facility]  [address], [address] [facility] , [address] [facility], [place] [address]: [phone]  F: [phone]      Dilma Quiles  (1980)    5/26/2023    Subjective:     Dilma Quiles is 36 y.o. female who complains today of:    Chief Complaint   Patient presents with    Knee Pain     Left     Results     MRI        HPI     Patient returns stating that she is feeling better since she is still limping and still has some pain at the end of the day of work but she has graduated from physical therapy and is doing her exercises at home  Allergies:  Latex, Bismuth subsalicylate, Antibacterial hand soap [triclosan], Kiwi extract, Other, Strawberry extract, Viibryd [vilazodone hcl], and Effexor [venlafaxine]    Past Medical History:   Diagnosis Date    Allergic rhinitis     Asthma     Chronic ear infection     Depressed     Double, Major and Dystemic    Fainting     since age 14    Fibromyalgia     Headache     POTS (postural orthostatic tachycardia syndrome) 11/11/2022    Scoliosis     Vasovagal syncope 11/11/2022    Vertigo     Vision loss     poor perpherial vision, doesn't drive     Past Surgical History:   Procedure Laterality Date    COLONOSCOPY N/A 10/21/2021    COLONOSCOPY WITH POLYPECTOMY performed by Shannan Handley MD at [address] [facility]       Family History   Problem Relation Age of Onset    Diabetes Mother 15    Arthritis Mother     Kidney Disease Mother         stage 3    Heart Disease Mother         CAD    Other Mother         Neurophathy, eye problems    Clotting Disorder Mother         Brain    Asthma Father     Diabetes Sister         due to mom DM    Alcohol Abuse Neg Hx     Allergy (Severe) Neg Hx     Anemia Neg Hx     Arrhythmia Neg Hx     Atrial Fibrillation Neg Hx     Birth Defects Neg Hx     Breast Cancer Neg Hx     Cancer Neg Hx     Coronary Art Dis Neg Hx     Colon Cancer Neg Hx     Depression Neg Hx     Early Death

## 2023-05-31 ENCOUNTER — APPOINTMENT (OUTPATIENT)
Dept: PHYSICAL THERAPY | Age: 43
End: 2023-05-31
Payer: COMMERCIAL

## 2023-06-23 ENCOUNTER — OFFICE VISIT (OUTPATIENT)
Dept: SPORTS MEDICINE | Age: 43
End: 2023-06-23

## 2023-06-23 VITALS
HEIGHT: 66 IN | BODY MASS INDEX: 27.32 KG/M2 | RESPIRATION RATE: 16 BRPM | SYSTOLIC BLOOD PRESSURE: 126 MMHG | WEIGHT: 170 LBS | TEMPERATURE: 97 F | DIASTOLIC BLOOD PRESSURE: 74 MMHG

## 2023-06-23 DIAGNOSIS — S86.812D STRAIN OF CALF MUSCLE, LEFT, SUBSEQUENT ENCOUNTER: Primary | ICD-10-CM

## 2023-06-23 ASSESSMENT — ENCOUNTER SYMPTOMS
CONSTIPATION: 0
BACK PAIN: 0
DIARRHEA: 0
NAUSEA: 0
SHORTNESS OF BREATH: 0

## 2023-06-23 NOTE — PROGRESS NOTES
[facility] ([facility]) Physicians  Neurosurgery and Pain [facility]  [address] [facility] , [address] [facility], [place]: [phone]  F: [phone]      Roc Chau  (1980)    6/23/2023    Subjective:     Roc Chau is 36 y.o. female who complains today of:    Chief Complaint   Patient presents with    Leg Pain     Strain of calf muscle, left, subsequent encounter    Knee Pain     Internal derangement of left knee       HPI     This patient returns stating that she is doing pretty well says she still gets a little bit of pain after her yoga but other than that she is doing fine  Allergies:  Latex, Bismuth subsalicylate, Antibacterial hand soap [triclosan], Kiwi extract, Other, Strawberry extract, Viibryd [vilazodone hcl], and Effexor [venlafaxine]    Past Medical History:   Diagnosis Date    Allergic rhinitis     Asthma     Chronic ear infection     Depressed     Double, Major and Dystemic    Fainting     since age 14    Fibromyalgia     Headache     POTS (postural orthostatic tachycardia syndrome) 11/11/2022    Scoliosis     Vasovagal syncope 11/11/2022    Vertigo     Vision loss     poor perpherial vision, doesn't drive     Past Surgical History:   Procedure Laterality Date    COLONOSCOPY N/A 10/21/2021    COLONOSCOPY WITH POLYPECTOMY performed by Son Tobin MD at [address] [facility]       Family History   Problem Relation Age of Onset    Diabetes Mother 15    Arthritis Mother     Kidney Disease Mother         stage 3    Heart Disease Mother         CAD    Other Mother         Neurophathy, eye problems    Clotting Disorder Mother         Brain    Asthma Father     Diabetes Sister         due to mom DM    Alcohol Abuse Neg Hx     Allergy (Severe) Neg Hx     Anemia Neg Hx     Arrhythmia Neg Hx     Atrial Fibrillation Neg Hx     Birth Defects Neg Hx     Breast Cancer Neg Hx     Cancer Neg Hx     Coronary Art Dis Neg Hx     Colon Cancer Neg Hx     Depression Neg Hx Private car

## 2023-07-06 LAB
ALANINE AMINOTRANSFERASE (SGPT) (U/L) IN SER/PLAS: 9 U/L (ref 7–45)
ALBUMIN (G/DL) IN SER/PLAS: 4.2 G/DL (ref 3.4–5)
ALKALINE PHOSPHATASE (U/L) IN SER/PLAS: 53 U/L (ref 33–110)
ANION GAP IN SER/PLAS: 10 MMOL/L (ref 10–20)
ASPARTATE AMINOTRANSFERASE (SGOT) (U/L) IN SER/PLAS: 14 U/L (ref 9–39)
BILIRUBIN TOTAL (MG/DL) IN SER/PLAS: 0.5 MG/DL (ref 0–1.2)
CALCIUM (MG/DL) IN SER/PLAS: 9.3 MG/DL (ref 8.6–10.3)
CARBON DIOXIDE, TOTAL (MMOL/L) IN SER/PLAS: 28 MMOL/L (ref 21–32)
CHLORIDE (MMOL/L) IN SER/PLAS: 105 MMOL/L (ref 98–107)
CREATININE (MG/DL) IN SER/PLAS: 0.73 MG/DL (ref 0.5–1.05)
ERYTHROCYTE DISTRIBUTION WIDTH (RATIO) BY AUTOMATED COUNT: 13.6 % (ref 11.5–14.5)
ERYTHROCYTE MEAN CORPUSCULAR HEMOGLOBIN CONCENTRATION (G/DL) BY AUTOMATED: 31.3 G/DL (ref 32–36)
ERYTHROCYTE MEAN CORPUSCULAR VOLUME (FL) BY AUTOMATED COUNT: 86 FL (ref 80–100)
ERYTHROCYTES (10*6/UL) IN BLOOD BY AUTOMATED COUNT: 4.55 X10E12/L (ref 4–5.2)
GFR FEMALE: >90 ML/MIN/1.73M2
GLUCOSE (MG/DL) IN SER/PLAS: 91 MG/DL (ref 74–99)
HEMATOCRIT (%) IN BLOOD BY AUTOMATED COUNT: 39.3 % (ref 36–46)
HEMOGLOBIN (G/DL) IN BLOOD: 12.3 G/DL (ref 12–16)
LEUKOCYTES (10*3/UL) IN BLOOD BY AUTOMATED COUNT: 7.5 X10E9/L (ref 4.4–11.3)
PLATELETS (10*3/UL) IN BLOOD AUTOMATED COUNT: 376 X10E9/L (ref 150–450)
POTASSIUM (MMOL/L) IN SER/PLAS: 4.5 MMOL/L (ref 3.5–5.3)
PROTEIN TOTAL: 7 G/DL (ref 6.4–8.2)
SODIUM (MMOL/L) IN SER/PLAS: 138 MMOL/L (ref 136–145)
UREA NITROGEN (MG/DL) IN SER/PLAS: 11 MG/DL (ref 6–23)

## 2023-11-09 ENCOUNTER — OFFICE VISIT (OUTPATIENT)
Dept: PRIMARY CARE CLINIC | Age: 43
End: 2023-11-09

## 2023-11-09 VITALS
BODY MASS INDEX: 29.7 KG/M2 | SYSTOLIC BLOOD PRESSURE: 118 MMHG | OXYGEN SATURATION: 99 % | WEIGHT: 184 LBS | HEART RATE: 80 BPM | DIASTOLIC BLOOD PRESSURE: 74 MMHG | TEMPERATURE: 97.9 F

## 2023-11-09 DIAGNOSIS — J06.9 ACUTE UPPER RESPIRATORY INFECTION, UNSPECIFIED: Primary | ICD-10-CM

## 2023-11-09 DIAGNOSIS — R05.9 COUGH IN ADULT: ICD-10-CM

## 2023-11-09 LAB
INFLUENZA A ANTIBODY: NEGATIVE
INFLUENZA B ANTIBODY: NEGATIVE
Lab: NORMAL
PERFORMING INSTRUMENT: NORMAL
QC PASS/FAIL: NORMAL
SARS-COV-2, POC: NORMAL

## 2023-11-09 NOTE — PROGRESS NOTES
Musculoskeletal:         General: Normal range of motion. Skin:     General: Skin is warm and dry. Neurological:      Mental Status: She is alert and oriented to person, place, and time. Mental status is at baseline. Psychiatric:         Mood and Affect: Mood normal.         Behavior: Behavior normal.               Reviewed with the patient: current clinical status, medications, activities and diet. Side effects, adverse effects of the medication prescribed today, as well as treatment plan/ rationale and result expectations have been discussed with the patient who expresses understanding and desires to proceed. Close follow up to evaluate treatment results and for coordination of care. I have reviewed the patient's medical history in detail and updated the computerized patient record.     Tavon Patricia MD

## 2023-11-09 NOTE — PATIENT INSTRUCTIONS
COVID and FLU negative    It is likely you have a viral infection as 90% of upper respiratory infections are viral.  The following treatments below are recommended for your symptoms. Please try these and reach out if your symptoms have not improved after 10 days from when they began.   -Vitamin C 1000 mg daily for 3 to 5 days  -Tylenol 500 to 1000 mg up to 3 times daily for aches and chills and fever  -Jessi pot/saline nasal rinses/Flonase for nasal congestion, sinus pressure, nasal drainage  -Cepacol or Chloraseptic throat spray for throat pain  -Mucinex-DM for cough

## 2023-11-13 ENCOUNTER — OFFICE VISIT (OUTPATIENT)
Dept: PRIMARY CARE CLINIC | Age: 43
End: 2023-11-13
Payer: COMMERCIAL

## 2023-11-13 VITALS
HEART RATE: 84 BPM | OXYGEN SATURATION: 97 % | TEMPERATURE: 98.4 F | WEIGHT: 182 LBS | SYSTOLIC BLOOD PRESSURE: 120 MMHG | RESPIRATION RATE: 18 BRPM | HEIGHT: 66 IN | BODY MASS INDEX: 29.25 KG/M2 | DIASTOLIC BLOOD PRESSURE: 74 MMHG

## 2023-11-13 DIAGNOSIS — F32.A DEPRESSION, UNSPECIFIED DEPRESSION TYPE: Primary | ICD-10-CM

## 2023-11-13 DIAGNOSIS — Z12.31 VISIT FOR SCREENING MAMMOGRAM: ICD-10-CM

## 2023-11-13 DIAGNOSIS — Z00.00 HEALTH CARE MAINTENANCE: ICD-10-CM

## 2023-11-13 DIAGNOSIS — G90.A POTS (POSTURAL ORTHOSTATIC TACHYCARDIA SYNDROME): ICD-10-CM

## 2023-11-13 PROCEDURE — G8419 CALC BMI OUT NRM PARAM NOF/U: HCPCS | Performed by: INTERNAL MEDICINE

## 2023-11-13 PROCEDURE — G8427 DOCREV CUR MEDS BY ELIG CLIN: HCPCS | Performed by: INTERNAL MEDICINE

## 2023-11-13 PROCEDURE — 99214 OFFICE O/P EST MOD 30 MIN: CPT | Performed by: INTERNAL MEDICINE

## 2023-11-13 PROCEDURE — 1036F TOBACCO NON-USER: CPT | Performed by: INTERNAL MEDICINE

## 2023-11-13 PROCEDURE — G8482 FLU IMMUNIZE ORDER/ADMIN: HCPCS | Performed by: INTERNAL MEDICINE

## 2023-11-13 PROCEDURE — 90674 CCIIV4 VAC NO PRSV 0.5 ML IM: CPT | Performed by: INTERNAL MEDICINE

## 2023-11-13 ASSESSMENT — ENCOUNTER SYMPTOMS
ABDOMINAL PAIN: 0
SHORTNESS OF BREATH: 0
VOMITING: 0
WHEEZING: 0
SINUS PAIN: 0
RHINORRHEA: 0
DIARRHEA: 0
SORE THROAT: 0
SINUS PRESSURE: 0
COUGH: 0
NAUSEA: 0

## 2023-11-13 NOTE — PROGRESS NOTES
Patient presents today for Influenza vaccine    Have you ever had a reaction to a flu vaccine? no  Are you able to eat eggs without adverse effects? no  Do you have any current illness? no  Have you ever had Gu[place] Syndrome? no    Flu vaccine given per order. Please see immunization tab. Patient tolerated well. Information sheet given.

## 2023-11-13 NOTE — PROGRESS NOTES
Subjective:      Patient ID: Maurice Galarza is a 39 y.o. female    Follow up   HPI  Pt presents for follow up regarding POTS. Controlled on florinef. Depression and anxiety controlled on Trintellix, Abilify and Prazosin. No SI. Follows with psychiatry. Past Medical History:   Diagnosis Date    Allergic rhinitis     Asthma     Chronic ear infection     Depressed     Double, Major and Dystemic    Fainting     since age 14    Fibromyalgia     Headache     POTS (postural orthostatic tachycardia syndrome) 11/11/2022    Scoliosis     Vasovagal syncope 11/11/2022    Vertigo     Vision loss     poor perpherial vision, doesn't drive     Past Surgical History:   Procedure Laterality Date    COLONOSCOPY N/A 10/21/2021    COLONOSCOPY WITH POLYPECTOMY performed by Elijah Favre, MD at [address] History     Socioeconomic History    Marital status:      Spouse name: Not on file    Number of children: Not on file    Years of education: Not on file    Highest education level: Not on file   Occupational History    Not on file   Tobacco Use    Smoking status: Former     Types: Cigarettes    Smokeless tobacco: Never   Vaping Use    Vaping Use: Never used   Substance and Sexual Activity    Alcohol use: Yes     Comment: rarely    Drug use: No    Sexual activity: Not on file   Other Topics Concern    Not on file   Social History Narrative    Not on file     Social Determinants of Health     Financial Resource Strain: Low Risk  (3/17/2023)    Overall Financial Resource Strain (CARDIA)     Difficulty of Paying Living Expenses: Not hard at all   Food Insecurity: No Food Insecurity (3/17/2023)    Hunger Vital Sign     Worried About Running Out of Food in the Last Year: Never true     [address] in the Last Year: Never true   Transportation Needs: Unknown (3/17/2023)    PRAPARE - Transportation     Lack of Transportation (Medical): Not on file     Lack of Transportation (Non-Medical):  No

## 2023-11-14 ENCOUNTER — OFFICE VISIT (OUTPATIENT)
Dept: CARDIOLOGY CLINIC | Age: 43
End: 2023-11-14
Payer: COMMERCIAL

## 2023-11-14 VITALS
HEART RATE: 91 BPM | SYSTOLIC BLOOD PRESSURE: 110 MMHG | WEIGHT: 182 LBS | DIASTOLIC BLOOD PRESSURE: 80 MMHG | BODY MASS INDEX: 29.38 KG/M2

## 2023-11-14 DIAGNOSIS — G90.A POTS (POSTURAL ORTHOSTATIC TACHYCARDIA SYNDROME): Primary | ICD-10-CM

## 2023-11-14 PROCEDURE — 1036F TOBACCO NON-USER: CPT | Performed by: INTERNAL MEDICINE

## 2023-11-14 PROCEDURE — 99213 OFFICE O/P EST LOW 20 MIN: CPT | Performed by: INTERNAL MEDICINE

## 2023-11-14 PROCEDURE — G8419 CALC BMI OUT NRM PARAM NOF/U: HCPCS | Performed by: INTERNAL MEDICINE

## 2023-11-14 PROCEDURE — 93000 ELECTROCARDIOGRAM COMPLETE: CPT | Performed by: INTERNAL MEDICINE

## 2023-11-14 PROCEDURE — G8482 FLU IMMUNIZE ORDER/ADMIN: HCPCS | Performed by: INTERNAL MEDICINE

## 2023-11-14 PROCEDURE — G8427 DOCREV CUR MEDS BY ELIG CLIN: HCPCS | Performed by: INTERNAL MEDICINE

## 2023-11-14 NOTE — PROGRESS NOTES
Chief Complaint   Patient presents with    1 Year Follow Up     POTS       Patient presents for initial medical evaluation. Patient is followed on a regular basis by Dr. Tomer Hilliard MD. Presents with fainting and passing out, happening since the age of 5. When getting up in am and event during day, from a seated position and passes out for a few seconds. No hx of any major trauma from episodes. Hx of negative stress test and echo in 2019  Was placed on prazosin for nightmares and symptoms got better. Pt denies chest pain, dyspnea, dyspnea on exertion, change in exercise capacity, fatigue,  nausea, vomiting, diarrhea, constipation, motor weakness, insomnia, weight loss, , palpitations, PND, orthopnea, or claudication. EKG is normal.   No hx of DM, HTN or HLD. No smoking. 11-14-23: doing well. No angina or heart failure type symptoms blood pressure is very well controlled 110/80. Last stress test as well as echo in 2018 were normal.  Patient with no history of diabetes hypertension hyperlipidemia or smoking lab work recently was within normal limits. His of POTS doing well on Florinef. EKG today with nonspecific ST changes, normal sinus rhythm. Patient Active Problem List   Diagnosis    Asthma    Allergic rhinitis    Chronic ear infection    Depressed    Fainting    Headache    Scoliosis    Vertigo    Vision loss    Polyp of colon    Back pain with sciatica    POTS (postural orthostatic tachycardia syndrome)    Vasovagal syncope       Past Surgical History:   Procedure Laterality Date    COLONOSCOPY N/A 10/21/2021    COLONOSCOPY WITH POLYPECTOMY performed by Randa Ordonez MD at [address] [place] [address] History     Socioeconomic History    Marital status:    Tobacco Use    Smoking status: Former     Types: Cigarettes    Smokeless tobacco: Never   Vaping Use    Vaping Use: Never used   Substance and Sexual Activity    Alcohol use: Yes     Comment: rarely    Drug use:  No

## 2023-12-15 ENCOUNTER — HOSPITAL ENCOUNTER (OUTPATIENT)
Dept: WOMENS IMAGING | Age: 43
End: 2023-12-15
Payer: COMMERCIAL

## 2023-12-15 VITALS — HEIGHT: 66 IN | BODY MASS INDEX: 29.39 KG/M2

## 2023-12-15 DIAGNOSIS — Z12.31 VISIT FOR SCREENING MAMMOGRAM: ICD-10-CM

## 2023-12-15 PROCEDURE — 77063 BREAST TOMOSYNTHESIS BI: CPT

## 2024-03-14 ENCOUNTER — HOSPITAL ENCOUNTER (EMERGENCY)
Age: 44
Discharge: HOME | End: 2024-03-14
Attending: EMERGENCY MEDICINE
Payer: COMMERCIAL

## 2024-03-14 VITALS
DIASTOLIC BLOOD PRESSURE: 90 MMHG | HEART RATE: 85 BPM | WEIGHT: 187 LBS | SYSTOLIC BLOOD PRESSURE: 130 MMHG | HEIGHT: 66 IN | BODY MASS INDEX: 30.05 KG/M2 | TEMPERATURE: 98 F | OXYGEN SATURATION: 98 % | RESPIRATION RATE: 19 BRPM

## 2024-03-14 DIAGNOSIS — J45.901 EXACERBATION OF ASTHMA, UNSPECIFIED ASTHMA SEVERITY, UNSPECIFIED WHETHER PERSISTENT: Primary | ICD-10-CM

## 2024-03-14 PROCEDURE — 6370000000 HC RX 637 (ALT 250 FOR IP): Performed by: EMERGENCY MEDICINE

## 2024-03-14 PROCEDURE — 94640 AIRWAY INHALATION TREATMENT: CPT

## 2024-03-14 PROCEDURE — 99283 EMERGENCY DEPT VISIT LOW MDM: CPT

## 2024-03-14 RX ORDER — PREDNISONE 20 MG/1
60 TABLET ORAL ONCE
Status: COMPLETED | OUTPATIENT
Start: 2024-03-14 | End: 2024-03-14

## 2024-03-14 RX ORDER — PREDNISONE 20 MG/1
40 TABLET ORAL DAILY
Qty: 10 TABLET | Refills: 0 | Status: SHIPPED | OUTPATIENT
Start: 2024-03-14 | End: 2024-03-19

## 2024-03-14 RX ORDER — IPRATROPIUM BROMIDE AND ALBUTEROL SULFATE 2.5; .5 MG/3ML; MG/3ML
1 SOLUTION RESPIRATORY (INHALATION) ONCE
Status: COMPLETED | OUTPATIENT
Start: 2024-03-14 | End: 2024-03-14

## 2024-03-14 RX ADMIN — IPRATROPIUM BROMIDE AND ALBUTEROL SULFATE 1 DOSE: 2.5; .5 SOLUTION RESPIRATORY (INHALATION) at 21:41

## 2024-03-14 RX ADMIN — PREDNISONE 60 MG: 20 TABLET ORAL at 21:31

## 2024-03-14 ASSESSMENT — PAIN - FUNCTIONAL ASSESSMENT: PAIN_FUNCTIONAL_ASSESSMENT: NONE - DENIES PAIN

## 2024-03-14 ASSESSMENT — ENCOUNTER SYMPTOMS: COUGH: 1

## 2024-03-14 ASSESSMENT — LIFESTYLE VARIABLES
HOW MANY STANDARD DRINKS CONTAINING ALCOHOL DO YOU HAVE ON A TYPICAL DAY: PATIENT DOES NOT DRINK
HOW OFTEN DO YOU HAVE A DRINK CONTAINING ALCOHOL: NEVER

## 2024-03-15 NOTE — ED PROVIDER NOTES
9:53 PM EDT  [facility] ED  EMERGENCY DEPARTMENT ENCOUNTER      Pt Name: Shayy Arcos  MRN: [id number]  Birthdate 1980  Date of evaluation: 3/14/2024  Provider: Neftali Chowdhury MD    CHIEF COMPLAINT       Chief Complaint   Patient presents with    Shortness of Breath         HISTORY OF PRESENT ILLNESS   (Location/Symptom, Timing/Onset, Context/Setting, Quality, Duration, Modifying Factors, Severity)  Note limiting factors.   43-year-old female presenting with shortness of breath.  Patient has a history of asthma and feels as though she is experiencing a flare.  Took a breathing treatment prior to arrival.  No chest pain or shortness of breath yet.  She feels that she is heading into an exacerbation.  No hospitalizations recently for asthma.  Occasional dry cough, no fevers.        Nursing Notes were reviewed.    REVIEW OF SYSTEMS    (2-9 systems for level 4, 10 or more for level 5)     Review of Systems   Respiratory:  Positive for cough.    All other systems reviewed and are negative.      Except as noted above the remainder of the review of systems was reviewed and negative.       PAST MEDICAL HISTORY     Past Medical History:   Diagnosis Date    Allergic rhinitis     Asthma     Chronic ear infection     Depressed     Double, Major and Dystemic    Fainting     since age 11    Fibromyalgia     Headache     POTS (postural orthostatic tachycardia syndrome) 11/11/2022    Scoliosis     Vasovagal syncope 11/11/2022    Vertigo     Vision loss     poor perpherial vision, doesn't drive         SURGICAL HISTORY       Past Surgical History:   Procedure Laterality Date    COLONOSCOPY N/A 10/21/2021    COLONOSCOPY WITH POLYPECTOMY performed by Genet Colbert MD at [facility] OR    TUBAL LIGATION           CURRENT MEDICATIONS       Previous Medications    ALBUTEROL (PROVENTIL) (2.5 MG/3ML) 0.083% NEBULIZER SOLUTION    Take 3 mLs by nebulization every 6 hours as needed for Wheezing    ALBUTEROL SULFATE HFA (VENTOLIN

## 2024-03-15 NOTE — ED TRIAGE NOTES
Patient with SOB that has gotten worse this evening. She denies nausea and vomiting. Pt with hx of asthma

## 2024-04-09 ENCOUNTER — OFFICE VISIT (OUTPATIENT)
Dept: PRIMARY CARE CLINIC | Age: 44
End: 2024-04-09
Payer: COMMERCIAL

## 2024-04-09 VITALS
WEIGHT: 193 LBS | HEART RATE: 95 BPM | BODY MASS INDEX: 31.02 KG/M2 | TEMPERATURE: 98.2 F | SYSTOLIC BLOOD PRESSURE: 122 MMHG | RESPIRATION RATE: 18 BRPM | OXYGEN SATURATION: 98 % | DIASTOLIC BLOOD PRESSURE: 80 MMHG

## 2024-04-09 DIAGNOSIS — J01.90 SUBACUTE SINUSITIS, UNSPECIFIED LOCATION: ICD-10-CM

## 2024-04-09 DIAGNOSIS — J02.9 PHARYNGITIS, UNSPECIFIED ETIOLOGY: Primary | ICD-10-CM

## 2024-04-09 LAB — S PYO AG THROAT QL: NORMAL

## 2024-04-09 PROCEDURE — 1036F TOBACCO NON-USER: CPT | Performed by: INTERNAL MEDICINE

## 2024-04-09 PROCEDURE — G8417 CALC BMI ABV UP PARAM F/U: HCPCS | Performed by: INTERNAL MEDICINE

## 2024-04-09 PROCEDURE — G8427 DOCREV CUR MEDS BY ELIG CLIN: HCPCS | Performed by: INTERNAL MEDICINE

## 2024-04-09 PROCEDURE — 99214 OFFICE O/P EST MOD 30 MIN: CPT | Performed by: INTERNAL MEDICINE

## 2024-04-09 PROCEDURE — 87880 STREP A ASSAY W/OPTIC: CPT | Performed by: INTERNAL MEDICINE

## 2024-04-09 RX ORDER — IPRATROPIUM BROMIDE 42 UG/1
2 SPRAY, METERED NASAL 4 TIMES DAILY
Qty: 15 ML | Refills: 3 | Status: SHIPPED | OUTPATIENT
Start: 2024-04-09

## 2024-04-09 RX ORDER — AMOXICILLIN 500 MG/1
500 CAPSULE ORAL 3 TIMES DAILY
Qty: 21 CAPSULE | Refills: 0 | Status: SHIPPED | OUTPATIENT
Start: 2024-04-09 | End: 2024-04-16

## 2024-04-09 RX ORDER — ARIPIPRAZOLE 10 MG/1
10 TABLET ORAL DAILY
COMMUNITY
Start: 2024-03-14

## 2024-04-09 RX ORDER — MONTELUKAST SODIUM 10 MG/1
10 TABLET ORAL DAILY
Qty: 30 TABLET | Refills: 3 | Status: SHIPPED | OUTPATIENT
Start: 2024-04-09

## 2024-04-09 SDOH — ECONOMIC STABILITY: FOOD INSECURITY: WITHIN THE PAST 12 MONTHS, YOU WORRIED THAT YOUR FOOD WOULD RUN OUT BEFORE YOU GOT MONEY TO BUY MORE.: NEVER TRUE

## 2024-04-09 SDOH — ECONOMIC STABILITY: FOOD INSECURITY: WITHIN THE PAST 12 MONTHS, THE FOOD YOU BOUGHT JUST DIDN'T LAST AND YOU DIDN'T HAVE MONEY TO GET MORE.: NEVER TRUE

## 2024-04-09 SDOH — ECONOMIC STABILITY: INCOME INSECURITY: HOW HARD IS IT FOR YOU TO PAY FOR THE VERY BASICS LIKE FOOD, HOUSING, MEDICAL CARE, AND HEATING?: NOT HARD AT ALL

## 2024-04-09 ASSESSMENT — ENCOUNTER SYMPTOMS
ABDOMINAL PAIN: 0
RHINORRHEA: 1
DIARRHEA: 0
SHORTNESS OF BREATH: 0
SINUS PAIN: 1
WHEEZING: 0
NAUSEA: 0
VOMITING: 0
COUGH: 0
SINUS PRESSURE: 1
SORE THROAT: 1

## 2024-04-09 ASSESSMENT — COLUMBIA-SUICIDE SEVERITY RATING SCALE - C-SSRS
1. WITHIN THE PAST MONTH, HAVE YOU WISHED YOU WERE DEAD OR WISHED YOU COULD GO TO SLEEP AND NOT WAKE UP?: NO
6. HAVE YOU EVER DONE ANYTHING, STARTED TO DO ANYTHING, OR PREPARED TO DO ANYTHING TO END YOUR LIFE?: NO
2. HAVE YOU ACTUALLY HAD ANY THOUGHTS OF KILLING YOURSELF?: NO

## 2024-04-09 ASSESSMENT — PATIENT HEALTH QUESTIONNAIRE - PHQ9
10. IF YOU CHECKED OFF ANY PROBLEMS, HOW DIFFICULT HAVE THESE PROBLEMS MADE IT FOR YOU TO DO YOUR WORK, TAKE CARE OF THINGS AT HOME, OR GET ALONG WITH OTHER PEOPLE: NOT DIFFICULT AT ALL
9. THOUGHTS THAT YOU WOULD BE BETTER OFF DEAD, OR OF HURTING YOURSELF: NOT AT ALL
SUM OF ALL RESPONSES TO PHQ9 QUESTIONS 1 & 2: 6
SUM OF ALL RESPONSES TO PHQ QUESTIONS 1-9: 24
7. TROUBLE CONCENTRATING ON THINGS, SUCH AS READING THE NEWSPAPER OR WATCHING TELEVISION: NEARLY EVERY DAY
6. FEELING BAD ABOUT YOURSELF - OR THAT YOU ARE A FAILURE OR HAVE LET YOURSELF OR YOUR FAMILY DOWN: NEARLY EVERY DAY
4. FEELING TIRED OR HAVING LITTLE ENERGY: NEARLY EVERY DAY
2. FEELING DOWN, DEPRESSED OR HOPELESS: NEARLY EVERY DAY
1. LITTLE INTEREST OR PLEASURE IN DOING THINGS: NEARLY EVERY DAY
8. MOVING OR SPEAKING SO SLOWLY THAT OTHER PEOPLE COULD HAVE NOTICED. OR THE OPPOSITE, BEING SO FIGETY OR RESTLESS THAT YOU HAVE BEEN MOVING AROUND A LOT MORE THAN USUAL: NEARLY EVERY DAY
SUM OF ALL RESPONSES TO PHQ QUESTIONS 1-9: 24
5. POOR APPETITE OR OVEREATING: NEARLY EVERY DAY
3. TROUBLE FALLING OR STAYING ASLEEP: NEARLY EVERY DAY
SUM OF ALL RESPONSES TO PHQ QUESTIONS 1-9: 24
SUM OF ALL RESPONSES TO PHQ QUESTIONS 1-9: 24

## 2024-04-09 NOTE — PROGRESS NOTES
vomiting.   Endocrine: Negative for cold intolerance and heat intolerance.   Genitourinary:  Negative for dysuria and frequency.   Neurological:  Negative for dizziness and light-headedness.       Objective:   /80   Pulse 95   Temp 98.2 °F (36.8 °C)   Resp 18   Wt 87.5 kg (193 lb)   SpO2 98%   BMI 31.02 kg/m²     Physical Exam  Constitutional:       General: She is not in acute distress.     Appearance: She is not diaphoretic.   HENT:      Head:      Comments: No TM erythema  Marked pharyngeal erythema but no tonsillar engorgement or exudates  No sinus TTP  Cardiovascular:      Rate and Rhythm: Normal rate and regular rhythm.      Pulses: Normal pulses.      Heart sounds: Normal heart sounds, S1 normal and S2 normal.   Pulmonary:      Effort: Pulmonary effort is normal. No respiratory distress.      Breath sounds: Normal breath sounds. No wheezing or rales.   Chest:      Chest wall: No tenderness.   Abdominal:      Tenderness: There is no abdominal tenderness.   Neurological:      Mental Status: She is alert.       Assessment:       Diagnosis Orders   1. Pharyngitis, unspecified etiology Inadequately Controlled POCT rapid strep A    amoxicillin (AMOXIL) 500 MG capsule    will treat for allergy and infection      2. Subacute sinusitis, unspecified location Inadequately Controlled POCT rapid strep A    montelukast (SINGULAIR) 10 MG tablet    amoxicillin (AMOXIL) 500 MG capsule    ipratropium (ATROVENT) 0.06 % nasal spray    treat for allergy and infection            Plan:      Orders Placed This Encounter   Procedures    POCT rapid strep A     Results for POC orders placed in visit on 04/09/24   POCT rapid strep A   Result Value Ref Range    Strep A Ag None Detected None Detected     Orders Placed This Encounter   Medications    montelukast (SINGULAIR) 10 MG tablet     Sig: Take 1 tablet by mouth daily     Dispense:  30 tablet     Refill:  3    amoxicillin (AMOXIL) 500 MG capsule     Sig: Take 1 capsule by

## 2025-03-28 ENCOUNTER — OFFICE VISIT (OUTPATIENT)
Dept: PRIMARY CARE CLINIC | Age: 45
End: 2025-03-28
Payer: COMMERCIAL

## 2025-03-28 ENCOUNTER — RESULTS FOLLOW-UP (OUTPATIENT)
Dept: PRIMARY CARE CLINIC | Age: 45
End: 2025-03-28

## 2025-03-28 VITALS
WEIGHT: 193 LBS | HEART RATE: 107 BPM | HEIGHT: 66 IN | BODY MASS INDEX: 31.02 KG/M2 | OXYGEN SATURATION: 100 % | SYSTOLIC BLOOD PRESSURE: 122 MMHG | DIASTOLIC BLOOD PRESSURE: 80 MMHG

## 2025-03-28 DIAGNOSIS — J01.90 SUBACUTE SINUSITIS, UNSPECIFIED LOCATION: ICD-10-CM

## 2025-03-28 DIAGNOSIS — J45.901 EXACERBATION OF ASTHMA, UNSPECIFIED ASTHMA SEVERITY, UNSPECIFIED WHETHER PERSISTENT: ICD-10-CM

## 2025-03-28 DIAGNOSIS — J40 BRONCHITIS: Primary | ICD-10-CM

## 2025-03-28 PROCEDURE — 1036F TOBACCO NON-USER: CPT | Performed by: INTERNAL MEDICINE

## 2025-03-28 PROCEDURE — 99213 OFFICE O/P EST LOW 20 MIN: CPT | Performed by: INTERNAL MEDICINE

## 2025-03-28 PROCEDURE — G8427 DOCREV CUR MEDS BY ELIG CLIN: HCPCS | Performed by: INTERNAL MEDICINE

## 2025-03-28 PROCEDURE — G8417 CALC BMI ABV UP PARAM F/U: HCPCS | Performed by: INTERNAL MEDICINE

## 2025-03-28 RX ORDER — ACETAMINOPHEN AND CODEINE PHOSPHATE 300; 15 MG/1; MG/1
1 TABLET ORAL EVERY 4 HOURS PRN
Qty: 42 TABLET | Refills: 0 | Status: SHIPPED | OUTPATIENT
Start: 2025-03-28 | End: 2025-04-04

## 2025-03-28 RX ORDER — MONTELUKAST SODIUM 10 MG/1
10 TABLET ORAL DAILY
Qty: 30 TABLET | Refills: 5 | Status: SHIPPED | OUTPATIENT
Start: 2025-03-28

## 2025-03-28 RX ORDER — LEVOFLOXACIN 500 MG/1
500 TABLET, FILM COATED ORAL DAILY
Qty: 10 TABLET | Refills: 0 | Status: SHIPPED | OUTPATIENT
Start: 2025-03-28 | End: 2025-04-07

## 2025-03-28 RX ORDER — METHYLPREDNISOLONE 4 MG/1
TABLET ORAL
Qty: 1 KIT | Refills: 0 | Status: SHIPPED | OUTPATIENT
Start: 2025-03-28 | End: 2025-04-03

## 2025-03-28 RX ORDER — IPRATROPIUM BROMIDE 42 UG/1
2 SPRAY, METERED NASAL 4 TIMES DAILY
Qty: 15 ML | Refills: 3 | Status: SHIPPED | OUTPATIENT
Start: 2025-03-28

## 2025-03-28 SDOH — ECONOMIC STABILITY: FOOD INSECURITY: WITHIN THE PAST 12 MONTHS, THE FOOD YOU BOUGHT JUST DIDN'T LAST AND YOU DIDN'T HAVE MONEY TO GET MORE.: NEVER TRUE

## 2025-03-28 SDOH — ECONOMIC STABILITY: FOOD INSECURITY: WITHIN THE PAST 12 MONTHS, YOU WORRIED THAT YOUR FOOD WOULD RUN OUT BEFORE YOU GOT MONEY TO BUY MORE.: NEVER TRUE

## 2025-03-28 ASSESSMENT — PATIENT HEALTH QUESTIONNAIRE - PHQ9
6. FEELING BAD ABOUT YOURSELF - OR THAT YOU ARE A FAILURE OR HAVE LET YOURSELF OR YOUR FAMILY DOWN: NOT AT ALL
10. IF YOU CHECKED OFF ANY PROBLEMS, HOW DIFFICULT HAVE THESE PROBLEMS MADE IT FOR YOU TO DO YOUR WORK, TAKE CARE OF THINGS AT HOME, OR GET ALONG WITH OTHER PEOPLE: NOT DIFFICULT AT ALL
8. MOVING OR SPEAKING SO SLOWLY THAT OTHER PEOPLE COULD HAVE NOTICED. OR THE OPPOSITE, BEING SO FIGETY OR RESTLESS THAT YOU HAVE BEEN MOVING AROUND A LOT MORE THAN USUAL: NOT AT ALL
1. LITTLE INTEREST OR PLEASURE IN DOING THINGS: NOT AT ALL
4. FEELING TIRED OR HAVING LITTLE ENERGY: NOT AT ALL
5. POOR APPETITE OR OVEREATING: NOT AT ALL
SUM OF ALL RESPONSES TO PHQ QUESTIONS 1-9: 0
9. THOUGHTS THAT YOU WOULD BE BETTER OFF DEAD, OR OF HURTING YOURSELF: NOT AT ALL
7. TROUBLE CONCENTRATING ON THINGS, SUCH AS READING THE NEWSPAPER OR WATCHING TELEVISION: NOT AT ALL
SUM OF ALL RESPONSES TO PHQ QUESTIONS 1-9: 0
2. FEELING DOWN, DEPRESSED OR HOPELESS: NOT AT ALL
SUM OF ALL RESPONSES TO PHQ QUESTIONS 1-9: 0
SUM OF ALL RESPONSES TO PHQ QUESTIONS 1-9: 0
3. TROUBLE FALLING OR STAYING ASLEEP: NOT AT ALL

## 2025-03-28 NOTE — PROGRESS NOTES
for back pain.    Diagnoses and all orders for this visit:    Bronchitis  -     acetaminophen-Codeine (TYLENOL #2) 300-15 MG per tablet; Take 1 tablet by mouth every 4 hours as needed for Pain (prn) for up to 7 days. Max Daily Amount: 6 tablets  -     XR CHEST (2 VW); Future  -     levoFLOXacin (LEVAQUIN) 500 MG tablet; Take 1 tablet by mouth daily for 10 days  -     methylPREDNISolone (MEDROL DOSEPACK) 4 MG tablet; Take by mouth.    Exacerbation of asthma, unspecified asthma severity, unspecified whether persistent  -     XR CHEST (2 VW); Future    Subacute sinusitis, unspecified location  Comments:  treat for allergy and infection  Orders:  -     montelukast (SINGULAIR) 10 MG tablet; Take 1 tablet by mouth daily  -     ipratropium (ATROVENT) 0.06 % nasal spray; 2 sprays by Each Nostril route 4 times daily  -     levoFLOXacin (LEVAQUIN) 500 MG tablet; Take 1 tablet by mouth daily for 10 days        No follow-ups on file.    Zac Villarreal MD

## 2025-03-31 ASSESSMENT — ENCOUNTER SYMPTOMS
APNEA: 0
FACIAL SWELLING: 0
PHOTOPHOBIA: 0
BLOOD IN STOOL: 0
CHOKING: 0
SINUS PRESSURE: 1
WHEEZING: 1
CHEST TIGHTNESS: 1
COUGH: 1
ABDOMINAL DISTENTION: 0

## (undated) DEVICE — FORCEPS BX L240CM JAW DIA2.8MM L CAP W/ NDL MIC MESH TOOTH

## (undated) DEVICE — ADAPTER FLSH PMP FLD MGMT GI IRRIG OFP 2 DISPOSABLE

## (undated) DEVICE — 4-PORT MANIFOLD: Brand: NEPTUNE 2

## (undated) DEVICE — ENDO CARRY-ON PROCEDURE KIT INCLUDES LUBRICANT, DEFENDO OLYMPUS AIR, WATER, SUCTION, BIOPSY VALVE KIT, ENZYMATIC SPONGE, AND BASIN.: Brand: ENDO CARRY-ON PROCEDURE KIT

## (undated) DEVICE — BW-412T DISP COMBO CLEANING BRUSH: Brand: SINGLE USE COMBINATION CLEANING BRUSH

## (undated) DEVICE — TUBE ENDOSCP COLON CHANNEL

## (undated) DEVICE — TRAYS TRANSPORT SCOPE OASIS W/LID

## (undated) DEVICE — MEDI-VAC NON-CONDUCTIVE SUCTION TUBING: Brand: CARDINAL HEALTH

## (undated) DEVICE — TUBE SET 96 MM 64 MM H2O PERISTALTIC STD AUX CHANNEL

## (undated) DEVICE — Device: Brand: ENDO SMARTCAP